# Patient Record
Sex: FEMALE | Race: OTHER | HISPANIC OR LATINO | URBAN - METROPOLITAN AREA
[De-identification: names, ages, dates, MRNs, and addresses within clinical notes are randomized per-mention and may not be internally consistent; named-entity substitution may affect disease eponyms.]

---

## 2023-02-23 ENCOUNTER — INPATIENT (INPATIENT)
Facility: HOSPITAL | Age: 38
LOS: 0 days | Discharge: HOME | DRG: 833 | End: 2023-02-24
Attending: OBSTETRICS & GYNECOLOGY | Admitting: OBSTETRICS & GYNECOLOGY
Payer: MEDICAID

## 2023-02-23 VITALS — TEMPERATURE: 98 F

## 2023-02-23 DIAGNOSIS — O47.03 FALSE LABOR BEFORE 37 COMPLETED WEEKS OF GESTATION, THIRD TRIMESTER: ICD-10-CM

## 2023-02-23 LAB
ALBUMIN SERPL ELPH-MCNC: 3.3 G/DL — SIGNIFICANT CHANGE UP (ref 3.3–5.2)
ALP SERPL-CCNC: 101 U/L — SIGNIFICANT CHANGE UP (ref 40–120)
ALT FLD-CCNC: 9 U/L — SIGNIFICANT CHANGE UP
ANION GAP SERPL CALC-SCNC: 13 MMOL/L — SIGNIFICANT CHANGE UP (ref 5–17)
APPEARANCE UR: CLEAR — SIGNIFICANT CHANGE UP
APTT BLD: 28.1 SEC — SIGNIFICANT CHANGE UP (ref 27.5–35.5)
AST SERPL-CCNC: 18 U/L — SIGNIFICANT CHANGE UP
BACTERIA # UR AUTO: ABNORMAL
BASOPHILS # BLD AUTO: 0.04 K/UL — SIGNIFICANT CHANGE UP (ref 0–0.2)
BASOPHILS NFR BLD AUTO: 0.4 % — SIGNIFICANT CHANGE UP (ref 0–2)
BILIRUB SERPL-MCNC: <0.2 MG/DL — LOW (ref 0.4–2)
BILIRUB UR-MCNC: NEGATIVE — SIGNIFICANT CHANGE UP
BUN SERPL-MCNC: 9.4 MG/DL — SIGNIFICANT CHANGE UP (ref 8–20)
CALCIUM SERPL-MCNC: 9.1 MG/DL — SIGNIFICANT CHANGE UP (ref 8.4–10.5)
CHLORIDE SERPL-SCNC: 103 MMOL/L — SIGNIFICANT CHANGE UP (ref 96–108)
CO2 SERPL-SCNC: 19 MMOL/L — LOW (ref 22–29)
COLOR SPEC: YELLOW — SIGNIFICANT CHANGE UP
CREAT ?TM UR-MCNC: 13 MG/DL — SIGNIFICANT CHANGE UP
CREAT SERPL-MCNC: 0.44 MG/DL — LOW (ref 0.5–1.3)
DIFF PNL FLD: ABNORMAL
EGFR: 129 ML/MIN/1.73M2 — SIGNIFICANT CHANGE UP
EOSINOPHIL # BLD AUTO: 0.18 K/UL — SIGNIFICANT CHANGE UP (ref 0–0.5)
EOSINOPHIL NFR BLD AUTO: 1.8 % — SIGNIFICANT CHANGE UP (ref 0–6)
EPI CELLS # UR: SIGNIFICANT CHANGE UP
FIBRINOGEN PPP-MCNC: 521 MG/DL — HIGH (ref 200–450)
GLUCOSE SERPL-MCNC: 104 MG/DL — HIGH (ref 70–99)
GLUCOSE UR QL: NEGATIVE MG/DL — SIGNIFICANT CHANGE UP
HCT VFR BLD CALC: 31.4 % — LOW (ref 34.5–45)
HGB BLD-MCNC: 10.5 G/DL — LOW (ref 11.5–15.5)
IMM GRANULOCYTES NFR BLD AUTO: 0.7 % — SIGNIFICANT CHANGE UP (ref 0–0.9)
INR BLD: 0.86 RATIO — LOW (ref 0.88–1.16)
KETONES UR-MCNC: NEGATIVE — SIGNIFICANT CHANGE UP
LDH SERPL L TO P-CCNC: 216 U/L — HIGH (ref 98–192)
LEUKOCYTE ESTERASE UR-ACNC: ABNORMAL
LYMPHOCYTES # BLD AUTO: 1.91 K/UL — SIGNIFICANT CHANGE UP (ref 1–3.3)
LYMPHOCYTES # BLD AUTO: 18.6 % — SIGNIFICANT CHANGE UP (ref 13–44)
MCHC RBC-ENTMCNC: 29.6 PG — SIGNIFICANT CHANGE UP (ref 27–34)
MCHC RBC-ENTMCNC: 33.4 GM/DL — SIGNIFICANT CHANGE UP (ref 32–36)
MCV RBC AUTO: 88.5 FL — SIGNIFICANT CHANGE UP (ref 80–100)
MONOCYTES # BLD AUTO: 0.77 K/UL — SIGNIFICANT CHANGE UP (ref 0–0.9)
MONOCYTES NFR BLD AUTO: 7.5 % — SIGNIFICANT CHANGE UP (ref 2–14)
NEUTROPHILS # BLD AUTO: 7.28 K/UL — SIGNIFICANT CHANGE UP (ref 1.8–7.4)
NEUTROPHILS NFR BLD AUTO: 71 % — SIGNIFICANT CHANGE UP (ref 43–77)
NITRITE UR-MCNC: NEGATIVE — SIGNIFICANT CHANGE UP
PH UR: 7 — SIGNIFICANT CHANGE UP (ref 5–8)
PLATELET # BLD AUTO: 239 K/UL — SIGNIFICANT CHANGE UP (ref 150–400)
POTASSIUM SERPL-MCNC: 4 MMOL/L — SIGNIFICANT CHANGE UP (ref 3.5–5.3)
POTASSIUM SERPL-SCNC: 4 MMOL/L — SIGNIFICANT CHANGE UP (ref 3.5–5.3)
PROT ?TM UR-MCNC: 184 MG/DL — HIGH (ref 0–12)
PROT SERPL-MCNC: 5.8 G/DL — LOW (ref 6.6–8.7)
PROT UR-MCNC: 100
PROT/CREAT UR-RTO: 14.2 RATIO — HIGH
PROTHROM AB SERPL-ACNC: 10 SEC — LOW (ref 10.5–13.4)
RBC # BLD: 3.55 M/UL — LOW (ref 3.8–5.2)
RBC # FLD: 13.9 % — SIGNIFICANT CHANGE UP (ref 10.3–14.5)
RBC CASTS # UR COMP ASSIST: SIGNIFICANT CHANGE UP /HPF (ref 0–4)
SODIUM SERPL-SCNC: 135 MMOL/L — SIGNIFICANT CHANGE UP (ref 135–145)
SP GR SPEC: 1 — LOW (ref 1.01–1.02)
URATE SERPL-MCNC: 4.6 MG/DL — SIGNIFICANT CHANGE UP (ref 2.4–5.7)
UROBILINOGEN FLD QL: NEGATIVE MG/DL — SIGNIFICANT CHANGE UP
WBC # BLD: 10.25 K/UL — SIGNIFICANT CHANGE UP (ref 3.8–10.5)
WBC # FLD AUTO: 10.25 K/UL — SIGNIFICANT CHANGE UP (ref 3.8–10.5)
WBC UR QL: SIGNIFICANT CHANGE UP /HPF (ref 0–5)

## 2023-02-23 PROCEDURE — 76818 FETAL BIOPHYS PROFILE W/NST: CPT | Mod: 26

## 2023-02-23 PROCEDURE — 99223 1ST HOSP IP/OBS HIGH 75: CPT | Mod: GC,25

## 2023-02-23 RX ORDER — LABETALOL HCL 100 MG
40 TABLET ORAL ONCE
Refills: 0 | Status: COMPLETED | OUTPATIENT
Start: 2023-02-23 | End: 2023-02-23

## 2023-02-23 RX ORDER — MAGNESIUM SULFATE 500 MG/ML
2 VIAL (ML) INJECTION
Qty: 40 | Refills: 0 | Status: DISCONTINUED | OUTPATIENT
Start: 2023-02-23 | End: 2023-02-24

## 2023-02-23 RX ORDER — LABETALOL HCL 100 MG
20 TABLET ORAL ONCE
Refills: 0 | Status: COMPLETED | OUTPATIENT
Start: 2023-02-23 | End: 2023-02-23

## 2023-02-23 RX ORDER — MAGNESIUM SULFATE 500 MG/ML
4 VIAL (ML) INJECTION ONCE
Refills: 0 | Status: COMPLETED | OUTPATIENT
Start: 2023-02-23 | End: 2023-02-23

## 2023-02-23 RX ADMIN — Medication 40 MILLIGRAM(S): at 23:37

## 2023-02-23 RX ADMIN — Medication 20 MILLIGRAM(S): at 23:21

## 2023-02-23 RX ADMIN — Medication 300 GRAM(S): at 23:43

## 2023-02-23 RX ADMIN — Medication 12 MILLIGRAM(S): at 23:57

## 2023-02-23 NOTE — OB RN PATIENT PROFILE - AS PAIN REST
Normal rate, regular rhythm, normal S1, S2 heart sounds heard.
0 (no pain/absence of nonverbal indicators of pain)

## 2023-02-23 NOTE — OB RN PATIENT PROFILE - FALL HARM RISK - UNIVERSAL INTERVENTIONS
Bed in lowest position, wheels locked, appropriate side rails in place/Call bell, personal items and telephone in reach/Instruct patient to call for assistance before getting out of bed or chair/Non-slip footwear when patient is out of bed/[place] to call system/Physically safe environment - no spills, clutter or unnecessary equipment/Purposeful Proactive Rounding/Room/bathroom lighting operational, light cord in reach

## 2023-02-24 ENCOUNTER — APPOINTMENT (OUTPATIENT)
Dept: ANTEPARTUM | Facility: CLINIC | Age: 38
End: 2023-02-24
Payer: MEDICAID

## 2023-02-24 ENCOUNTER — INPATIENT (INPATIENT)
Facility: HOSPITAL | Age: 38
LOS: 12 days | Discharge: HOME | End: 2023-03-09
Attending: OBSTETRICS & GYNECOLOGY | Admitting: OBSTETRICS & GYNECOLOGY
Payer: MEDICAID

## 2023-02-24 ENCOUNTER — ASOB RESULT (OUTPATIENT)
Age: 38
End: 2023-02-24

## 2023-02-24 VITALS — DIASTOLIC BLOOD PRESSURE: 88 MMHG | SYSTOLIC BLOOD PRESSURE: 157 MMHG | HEART RATE: 80 BPM

## 2023-02-24 VITALS — OXYGEN SATURATION: 98 % | HEART RATE: 80 BPM

## 2023-02-24 DIAGNOSIS — O14.10 SEVERE PRE-ECLAMPSIA, UNSPECIFIED TRIMESTER: ICD-10-CM

## 2023-02-24 DIAGNOSIS — Z00.00 ENCOUNTER FOR GENERAL ADULT MEDICAL EXAMINATION WITHOUT ABNORMAL FINDINGS: ICD-10-CM

## 2023-02-24 DIAGNOSIS — Z3A.28 28 WEEKS GESTATION OF PREGNANCY: ICD-10-CM

## 2023-02-24 DIAGNOSIS — O14.13 SEVERE PRE-ECLAMPSIA, THIRD TRIMESTER: ICD-10-CM

## 2023-02-24 LAB
ABO RH CONFIRMATION: SIGNIFICANT CHANGE UP
ALBUMIN SERPL ELPH-MCNC: 3.4 G/DL — SIGNIFICANT CHANGE UP (ref 3.3–5.2)
ALBUMIN SERPL ELPH-MCNC: 3.7 G/DL — SIGNIFICANT CHANGE UP (ref 3.3–5)
ALP SERPL-CCNC: 104 U/L — SIGNIFICANT CHANGE UP (ref 40–120)
ALP SERPL-CCNC: 99 U/L — SIGNIFICANT CHANGE UP (ref 40–120)
ALT FLD-CCNC: 10 U/L — SIGNIFICANT CHANGE UP (ref 10–45)
ALT FLD-CCNC: 9 U/L — SIGNIFICANT CHANGE UP
ANION GAP SERPL CALC-SCNC: 11 MMOL/L — SIGNIFICANT CHANGE UP (ref 5–17)
ANION GAP SERPL CALC-SCNC: 14 MMOL/L — SIGNIFICANT CHANGE UP (ref 5–17)
APTT BLD: 26.7 SEC — LOW (ref 27.5–35.5)
APTT BLD: 28.2 SEC — SIGNIFICANT CHANGE UP (ref 27.5–35.5)
AST SERPL-CCNC: 15 U/L — SIGNIFICANT CHANGE UP (ref 10–40)
AST SERPL-CCNC: 16 U/L — SIGNIFICANT CHANGE UP
BASOPHILS # BLD AUTO: 0.03 K/UL — SIGNIFICANT CHANGE UP (ref 0–0.2)
BASOPHILS # BLD AUTO: 0.04 K/UL — SIGNIFICANT CHANGE UP (ref 0–0.2)
BASOPHILS NFR BLD AUTO: 0.2 % — SIGNIFICANT CHANGE UP (ref 0–2)
BASOPHILS NFR BLD AUTO: 0.3 % — SIGNIFICANT CHANGE UP (ref 0–2)
BILIRUB SERPL-MCNC: 0.1 MG/DL — LOW (ref 0.2–1.2)
BILIRUB SERPL-MCNC: <0.2 MG/DL — LOW (ref 0.4–2)
BLD GP AB SCN SERPL QL: NEGATIVE — SIGNIFICANT CHANGE UP
BLD GP AB SCN SERPL QL: SIGNIFICANT CHANGE UP
BUN SERPL-MCNC: 10 MG/DL — SIGNIFICANT CHANGE UP (ref 7–23)
BUN SERPL-MCNC: 8.1 MG/DL — SIGNIFICANT CHANGE UP (ref 8–20)
CALCIUM SERPL-MCNC: 8.1 MG/DL — LOW (ref 8.4–10.5)
CALCIUM SERPL-MCNC: 8.7 MG/DL — SIGNIFICANT CHANGE UP (ref 8.4–10.5)
CHLORIDE SERPL-SCNC: 101 MMOL/L — SIGNIFICANT CHANGE UP (ref 96–108)
CHLORIDE SERPL-SCNC: 104 MMOL/L — SIGNIFICANT CHANGE UP (ref 96–108)
CO2 SERPL-SCNC: 17 MMOL/L — LOW (ref 22–31)
CO2 SERPL-SCNC: 19 MMOL/L — LOW (ref 22–29)
COVID-19 SPIKE DOMAIN AB INTERP: POSITIVE
COVID-19 SPIKE DOMAIN ANTIBODY RESULT: >250 U/ML — HIGH
CREAT SERPL-MCNC: 0.47 MG/DL — LOW (ref 0.5–1.3)
CREAT SERPL-MCNC: 0.66 MG/DL — SIGNIFICANT CHANGE UP (ref 0.5–1.3)
EGFR: 117 ML/MIN/1.73M2 — SIGNIFICANT CHANGE UP
EGFR: 127 ML/MIN/1.73M2 — SIGNIFICANT CHANGE UP
EOSINOPHIL # BLD AUTO: 0 K/UL — SIGNIFICANT CHANGE UP (ref 0–0.5)
EOSINOPHIL # BLD AUTO: 0.02 K/UL — SIGNIFICANT CHANGE UP (ref 0–0.5)
EOSINOPHIL NFR BLD AUTO: 0 % — SIGNIFICANT CHANGE UP (ref 0–6)
EOSINOPHIL NFR BLD AUTO: 0.2 % — SIGNIFICANT CHANGE UP (ref 0–6)
FIBRINOGEN PPP-MCNC: 465 MG/DL — HIGH (ref 200–445)
FIBRINOGEN PPP-MCNC: 510 MG/DL — HIGH (ref 200–450)
GLUCOSE SERPL-MCNC: 130 MG/DL — HIGH (ref 70–99)
GLUCOSE SERPL-MCNC: 147 MG/DL — HIGH (ref 70–99)
HBV SURFACE AG SERPL QL IA: SIGNIFICANT CHANGE UP
HCT VFR BLD CALC: 33.6 % — LOW (ref 34.5–45)
HCT VFR BLD CALC: 33.7 % — LOW (ref 34.5–45)
HGB BLD-MCNC: 11.2 G/DL — LOW (ref 11.5–15.5)
HGB BLD-MCNC: 11.4 G/DL — LOW (ref 11.5–15.5)
HIV 1 & 2 AB SERPL IA.RAPID: SIGNIFICANT CHANGE UP
HIV 1+2 AB+HIV1 P24 AG SERPL QL IA: SIGNIFICANT CHANGE UP
IMM GRANULOCYTES NFR BLD AUTO: 1.3 % — HIGH (ref 0–0.9)
IMM GRANULOCYTES NFR BLD AUTO: 1.6 % — HIGH (ref 0–0.9)
INR BLD: 0.89 RATIO — SIGNIFICANT CHANGE UP (ref 0.88–1.16)
LDH SERPL L TO P-CCNC: 201 U/L — SIGNIFICANT CHANGE UP (ref 50–242)
LYMPHOCYTES # BLD AUTO: 1.02 K/UL — SIGNIFICANT CHANGE UP (ref 1–3.3)
LYMPHOCYTES # BLD AUTO: 1.2 K/UL — SIGNIFICANT CHANGE UP (ref 1–3.3)
LYMPHOCYTES # BLD AUTO: 8.2 % — LOW (ref 13–44)
LYMPHOCYTES # BLD AUTO: 8.5 % — LOW (ref 13–44)
MAGNESIUM SERPL-MCNC: 6 MG/DL — HIGH (ref 1.8–2.6)
MAGNESIUM SERPL-MCNC: 6.7 MG/DL — HIGH (ref 1.6–2.6)
MAGNESIUM SERPL-MCNC: 6.9 MG/DL — HIGH (ref 1.6–2.6)
MCHC RBC-ENTMCNC: 30.2 PG — SIGNIFICANT CHANGE UP (ref 27–34)
MCHC RBC-ENTMCNC: 30.3 PG — SIGNIFICANT CHANGE UP (ref 27–34)
MCHC RBC-ENTMCNC: 33.3 GM/DL — SIGNIFICANT CHANGE UP (ref 32–36)
MCHC RBC-ENTMCNC: 33.8 GM/DL — SIGNIFICANT CHANGE UP (ref 32–36)
MCV RBC AUTO: 89.2 FL — SIGNIFICANT CHANGE UP (ref 80–100)
MCV RBC AUTO: 90.8 FL — SIGNIFICANT CHANGE UP (ref 80–100)
MONOCYTES # BLD AUTO: 0.24 K/UL — SIGNIFICANT CHANGE UP (ref 0–0.9)
MONOCYTES # BLD AUTO: 0.47 K/UL — SIGNIFICANT CHANGE UP (ref 0–0.9)
MONOCYTES NFR BLD AUTO: 2 % — SIGNIFICANT CHANGE UP (ref 2–14)
MONOCYTES NFR BLD AUTO: 3.2 % — SIGNIFICANT CHANGE UP (ref 2–14)
NEUTROPHILS # BLD AUTO: 10.43 K/UL — HIGH (ref 1.8–7.4)
NEUTROPHILS # BLD AUTO: 12.73 K/UL — HIGH (ref 1.8–7.4)
NEUTROPHILS NFR BLD AUTO: 87.1 % — HIGH (ref 43–77)
NEUTROPHILS NFR BLD AUTO: 87.4 % — HIGH (ref 43–77)
NRBC # BLD: 0 /100 WBCS — SIGNIFICANT CHANGE UP (ref 0–0)
PLATELET # BLD AUTO: 242 K/UL — SIGNIFICANT CHANGE UP (ref 150–400)
PLATELET # BLD AUTO: 257 K/UL — SIGNIFICANT CHANGE UP (ref 150–400)
POTASSIUM SERPL-MCNC: 4.4 MMOL/L — SIGNIFICANT CHANGE UP (ref 3.5–5.3)
POTASSIUM SERPL-MCNC: 4.5 MMOL/L — SIGNIFICANT CHANGE UP (ref 3.5–5.3)
POTASSIUM SERPL-SCNC: 4.4 MMOL/L — SIGNIFICANT CHANGE UP (ref 3.5–5.3)
POTASSIUM SERPL-SCNC: 4.5 MMOL/L — SIGNIFICANT CHANGE UP (ref 3.5–5.3)
PROT SERPL-MCNC: 6.3 G/DL — LOW (ref 6.6–8.7)
PROT SERPL-MCNC: 6.6 G/DL — SIGNIFICANT CHANGE UP (ref 6–8.3)
PROTHROM AB SERPL-ACNC: 10.2 SEC — LOW (ref 10.5–13.4)
RBC # BLD: 3.7 M/UL — LOW (ref 3.8–5.2)
RBC # BLD: 3.78 M/UL — LOW (ref 3.8–5.2)
RBC # FLD: 14 % — SIGNIFICANT CHANGE UP (ref 10.3–14.5)
RBC # FLD: 14.4 % — SIGNIFICANT CHANGE UP (ref 10.3–14.5)
RH IG SCN BLD-IMP: POSITIVE — SIGNIFICANT CHANGE UP
SARS-COV-2 IGG+IGM SERPL QL IA: >250 U/ML — HIGH
SARS-COV-2 IGG+IGM SERPL QL IA: POSITIVE
SARS-COV-2 RNA SPEC QL NAA+PROBE: SIGNIFICANT CHANGE UP
SODIUM SERPL-SCNC: 132 MMOL/L — LOW (ref 135–145)
SODIUM SERPL-SCNC: 134 MMOL/L — LOW (ref 135–145)
T PALLIDUM AB TITR SER: NEGATIVE — SIGNIFICANT CHANGE UP
URATE SERPL-MCNC: 4.6 MG/DL — SIGNIFICANT CHANGE UP (ref 2.4–5.7)
URATE SERPL-MCNC: 5 MG/DL — SIGNIFICANT CHANGE UP (ref 2.5–7)
URATE SERPL-MCNC: 5.1 MG/DL — SIGNIFICANT CHANGE UP (ref 2.5–7)
WBC # BLD: 11.94 K/UL — HIGH (ref 3.8–10.5)
WBC # BLD: 14.62 K/UL — HIGH (ref 3.8–10.5)
WBC # FLD AUTO: 11.94 K/UL — HIGH (ref 3.8–10.5)
WBC # FLD AUTO: 14.62 K/UL — HIGH (ref 3.8–10.5)

## 2023-02-24 PROCEDURE — U0003: CPT

## 2023-02-24 PROCEDURE — U0005: CPT

## 2023-02-24 PROCEDURE — 76819 FETAL BIOPHYS PROFIL W/O NST: CPT | Mod: 26

## 2023-02-24 PROCEDURE — 83615 LACTATE (LD) (LDH) ENZYME: CPT

## 2023-02-24 PROCEDURE — 86900 BLOOD TYPING SEROLOGIC ABO: CPT

## 2023-02-24 PROCEDURE — 85025 COMPLETE CBC W/AUTO DIFF WBC: CPT

## 2023-02-24 PROCEDURE — 85730 THROMBOPLASTIN TIME PARTIAL: CPT

## 2023-02-24 PROCEDURE — 83735 ASSAY OF MAGNESIUM: CPT

## 2023-02-24 PROCEDURE — 76820 UMBILICAL ARTERY ECHO: CPT | Mod: 26,59,77

## 2023-02-24 PROCEDURE — 76819 FETAL BIOPHYS PROFIL W/O NST: CPT | Mod: 26,77,59

## 2023-02-24 PROCEDURE — 80053 COMPREHEN METABOLIC PANEL: CPT

## 2023-02-24 PROCEDURE — 76816 OB US FOLLOW-UP PER FETUS: CPT | Mod: 26

## 2023-02-24 PROCEDURE — 87389 HIV-1 AG W/HIV-1&-2 AB AG IA: CPT

## 2023-02-24 PROCEDURE — 76821 MIDDLE CEREBRAL ARTERY ECHO: CPT | Mod: 26,59

## 2023-02-24 PROCEDURE — 86769 SARS-COV-2 COVID-19 ANTIBODY: CPT

## 2023-02-24 PROCEDURE — 93976 VASCULAR STUDY: CPT | Mod: 26

## 2023-02-24 PROCEDURE — 76816 OB US FOLLOW-UP PER FETUS: CPT | Mod: 26,77

## 2023-02-24 PROCEDURE — 99221 1ST HOSP IP/OBS SF/LOW 40: CPT

## 2023-02-24 PROCEDURE — 36415 COLL VENOUS BLD VENIPUNCTURE: CPT

## 2023-02-24 PROCEDURE — 86901 BLOOD TYPING SEROLOGIC RH(D): CPT

## 2023-02-24 PROCEDURE — 86703 HIV-1/HIV-2 1 RESULT ANTBDY: CPT

## 2023-02-24 PROCEDURE — 85610 PROTHROMBIN TIME: CPT

## 2023-02-24 PROCEDURE — 84550 ASSAY OF BLOOD/URIC ACID: CPT

## 2023-02-24 PROCEDURE — 76820 UMBILICAL ARTERY ECHO: CPT | Mod: 26

## 2023-02-24 PROCEDURE — 86780 TREPONEMA PALLIDUM: CPT

## 2023-02-24 PROCEDURE — 84156 ASSAY OF PROTEIN URINE: CPT

## 2023-02-24 PROCEDURE — 86850 RBC ANTIBODY SCREEN: CPT

## 2023-02-24 PROCEDURE — 81001 URINALYSIS AUTO W/SCOPE: CPT

## 2023-02-24 PROCEDURE — 82570 ASSAY OF URINE CREATININE: CPT

## 2023-02-24 RX ORDER — SODIUM CHLORIDE 9 MG/ML
1000 INJECTION, SOLUTION INTRAVENOUS
Refills: 0 | Status: DISCONTINUED | OUTPATIENT
Start: 2023-02-24 | End: 2023-02-25

## 2023-02-24 RX ORDER — LABETALOL HCL 100 MG
80 TABLET ORAL ONCE
Refills: 0 | Status: COMPLETED | OUTPATIENT
Start: 2023-02-24 | End: 2023-02-24

## 2023-02-24 RX ORDER — CITRIC ACID/SODIUM CITRATE 300-500 MG
15 SOLUTION, ORAL ORAL EVERY 6 HOURS
Refills: 0 | Status: DISCONTINUED | OUTPATIENT
Start: 2023-02-24 | End: 2023-02-25

## 2023-02-24 RX ORDER — NIFEDIPINE 30 MG
30 TABLET, EXTENDED RELEASE 24 HR ORAL DAILY
Refills: 0 | Status: DISCONTINUED | OUTPATIENT
Start: 2023-02-24 | End: 2023-02-24

## 2023-02-24 RX ORDER — NIFEDIPINE 30 MG
30 TABLET, EXTENDED RELEASE 24 HR ORAL DAILY
Refills: 0 | Status: DISCONTINUED | OUTPATIENT
Start: 2023-02-24 | End: 2023-02-25

## 2023-02-24 RX ORDER — MAGNESIUM SULFATE 500 MG/ML
2 VIAL (ML) INJECTION
Qty: 40 | Refills: 0 | Status: DISCONTINUED | OUTPATIENT
Start: 2023-02-24 | End: 2023-02-25

## 2023-02-24 RX ORDER — OXYTOCIN 10 UNIT/ML
333.33 VIAL (ML) INJECTION
Qty: 20 | Refills: 0 | Status: COMPLETED | OUTPATIENT
Start: 2023-02-24 | End: 2023-02-24

## 2023-02-24 RX ORDER — CHLORHEXIDINE GLUCONATE 213 G/1000ML
1 SOLUTION TOPICAL ONCE
Refills: 0 | Status: DISCONTINUED | OUTPATIENT
Start: 2023-02-24 | End: 2023-02-25

## 2023-02-24 RX ORDER — SODIUM CHLORIDE 9 MG/ML
1000 INJECTION, SOLUTION INTRAVENOUS
Refills: 0 | Status: DISCONTINUED | OUTPATIENT
Start: 2023-02-24 | End: 2023-02-24

## 2023-02-24 RX ADMIN — Medication 80 MILLIGRAM(S): at 00:37

## 2023-02-24 RX ADMIN — Medication 12 MILLIGRAM(S): at 23:31

## 2023-02-24 RX ADMIN — Medication 30 MILLIGRAM(S): at 02:13

## 2023-02-24 RX ADMIN — Medication 50 GM/HR: at 00:04

## 2023-02-24 RX ADMIN — Medication 30 MILLIGRAM(S): at 19:40

## 2023-02-24 RX ADMIN — SODIUM CHLORIDE 75 MILLILITER(S): 9 INJECTION, SOLUTION INTRAVENOUS at 06:15

## 2023-02-24 NOTE — OB PROVIDER H&P - NSHPLABSRESULTS_GEN_ALL_CORE
Labs:             11.4   11.94 )-----------( 242      ( 02-24 @ 06:30 )             33.7     02-24 @ 06:30    134  |  104  |  8.1  ----------------------------<  130  4.5   |  19.0  |  0.47    Ca    8.7      02-24 @ 06:30  Mg     6.7     02-24 @ 12:02    TPro  6.3  /  Alb  3.4  /  TBili  <0.2  /  DBili  x   /  AST  16  /  ALT  9   /  AlkPhos  99  02-24 @ 06:30    PT/INR - ( 02-23 @ 23:11 )   PT: 10.0 sec;   INR: 0.86 ratio    PTT - ( 02-24 @ 06:30 )  PTT:28.2 sec    Uric Acid: (02-24 @ 06:30)  4.6      Fibrinogen: (02-24 @ 06:30)  --       LDH: (02-24 @ 06:30)  --       CAPILLARY BLOOD GLUCOSE

## 2023-02-24 NOTE — OB PROVIDER LABOR PROGRESS NOTE - ASSESSMENT
38 yo  at 28.6 weeks presents with severe pre-eclampsia requiring emergent antihypertensive medication, fetus with severe growth restriction but reassuring fetal heart tracing no s/s pre-term labor. Consent for transfer to [place] signed after questions answered thru .  -continue Magnesium infusion, no s/s toxicity  -reassuring fetal testing  -s/p betamethasone x1  -breech presentation  -case discussed with [place]  36 yo  at 28.6 weeks presents with severe pre-eclampsia requiring emergent antihypertensive medication, fetus with severe growth restriction and elevated dopplers but reassuring fetal heart tracing no s/s pre-term labor. Consent for transfer to [place] signed after questions answered thru .  -continue Magnesium infusion, no s/s toxicity  -reassuring fetal testing  -s/p betamethasone x1  -breech presentation  -case discussed with [facility]  38 yo  at 28.6 weeks presents with severe pre-eclampsia requiring emergent antihypertensive medication, fetus with severe growth restriction and elevated dopplers but reassuring fetal heart tracing no s/s pre-term labor. Consent for transfer to [place] signed after questions answered thru .  -continue Magnesium infusion, no s/s toxicity  -reassuring fetal testing  -s/p betamethasone x1  -breech presentation  [facility] Dr[facility]

## 2023-02-24 NOTE — OB PROVIDER H&P - NSPREVIOUSTERM_OBGYN_ALL_OB
Discharge Instructions    Discharged to home by car with self. Discharged via walking, hospital escort: Refused.  Special equipment needed: Not Applicable    Be sure to schedule a follow-up appointment with your primary care doctor or any specialists as instructed.     Discharge Plan:   Smoking Cessation Offered: Patient Counseled  Pneumococcal Vaccine Given - only chart on this line when given: Given (See MAR)  Influenza Vaccine Indication: Indicated: Not available from distributor/    I understand that a diet low in cholesterol, fat, and sodium is recommended for good health. Unless I have been given specific instructions below for another diet, I accept this instruction as my diet prescription.   Other diet: regular    Special Instructions: None    · Is patient discharged on Warfarin / Coumadin?   No     · Is patient Post Blood Transfusion NO    Chronic Obstructive Pulmonary Disease  Chronic obstructive pulmonary disease (COPD) is a common lung condition in which airflow from the lungs is limited. COPD is a general term that can be used to describe many different lung problems that limit airflow, including both chronic bronchitis and emphysema. If you have COPD, your lung function will probably never return to normal, but there are measures you can take to improve lung function and make yourself feel better.  CAUSES   Smoking (common).  Exposure to secondhand smoke.  Genetic problems.  Chronic inflammatory lung diseases or recurrent infections.  SYMPTOMS  Shortness of breath, especially with physical activity.  Deep, persistent (chronic) cough with a large amount of thick mucus.  Wheezing.  Rapid breaths (tachypnea).  Gray or bluish discoloration (cyanosis) of the skin, especially in your fingers, toes, or lips.  Fatigue.  Weight loss.  Frequent infections or episodes when breathing symptoms become much worse (exacerbations).  Chest tightness.  DIAGNOSIS  Your health care provider will take a medical  history and perform a physical examination to diagnose COPD. Additional tests for COPD may include:  Lung (pulmonary) function tests.  Chest X-ray.  CT scan.  Blood tests.  TREATMENT   Treatment for COPD may include:  Inhaler and nebulizer medicines. These help manage the symptoms of COPD and make your breathing more comfortable.  Supplemental oxygen. Supplemental oxygen is only helpful if you have a low oxygen level in your blood.  Exercise and physical activity. These are beneficial for nearly all people with COPD.  Lung surgery or transplant.  Nutrition therapy to gain weight, if you are underweight.  Pulmonary rehabilitation. This may involve working with a team of health care providers and specialists, such as respiratory, occupational, and physical therapists.  HOME CARE INSTRUCTIONS  Take all medicines (inhaled or pills) as directed by your health care provider.  Avoid over-the-counter medicines or cough syrups that dry up your airway (such as antihistamines) and slow down the elimination of secretions unless instructed otherwise by your health care provider.  If you are a smoker, the most important thing that you can do is stop smoking. Continuing to smoke will cause further lung damage and breathing trouble. Ask your health care provider for help with quitting smoking. He or she can direct you to community resources or hospitals that provide support.  Avoid exposure to irritants such as smoke, chemicals, and fumes that aggravate your breathing.  Use oxygen therapy and pulmonary rehabilitation if directed by your health care provider. If you require home oxygen therapy, ask your health care provider whether you should purchase a pulse oximeter to measure your oxygen level at home.  Avoid contact with individuals who have a contagious illness.  Avoid extreme temperature and humidity changes.  Eat healthy foods. Eating smaller, more frequent meals and resting before meals may help you maintain your  strength.  Stay active, but balance activity with periods of rest. Exercise and physical activity will help you maintain your ability to do things you want to do.  Preventing infection and hospitalization is very important when you have COPD. Make sure to receive all the vaccines your health care provider recommends, especially the pneumococcal and influenza vaccines. Ask your health care provider whether you need a pneumonia vaccine.  Learn and use relaxation techniques to manage stress.  Learn and use controlled breathing techniques as directed by your health care provider. Controlled breathing techniques include:  Pursed lip breathing. Start by breathing in (inhaling) through your nose for 1 second. Then, purse your lips as if you were going to whistle and breathe out (exhale) through the pursed lips for 2 seconds.  Diaphragmatic breathing. Start by putting one hand on your abdomen just above your waist. Inhale slowly through your nose. The hand on your abdomen should move out. Then purse your lips and exhale slowly. You should be able to feel the hand on your abdomen moving in as you exhale.  Learn and use controlled coughing to clear mucus from your lungs. Controlled coughing is a series of short, progressive coughs. The steps of controlled coughing are:  Lean your head slightly forward.  Breathe in deeply using diaphragmatic breathing.  Try to hold your breath for 3 seconds.  Keep your mouth slightly open while coughing twice.  Spit any mucus out into a tissue.  Rest and repeat the steps once or twice as needed.  SEEK MEDICAL CARE IF:  You are coughing up more mucus than usual.  There is a change in the color or thickness of your mucus.  Your breathing is more labored than usual.  Your breathing is faster than usual.  SEEK IMMEDIATE MEDICAL CARE IF:  You have shortness of breath while you are resting.  You have shortness of breath that prevents you from:  Being able to talk.  Performing your usual physical  activities.  You have chest pain lasting longer than 5 minutes.  Your skin color is more cyanotic than usual.  You measure low oxygen saturations for longer than 5 minutes with a pulse oximeter.  MAKE SURE YOU:  Understand these instructions.  Will watch your condition.  Will get help right away if you are not doing well or get worse.     This information is not intended to replace advice given to you by your health care provider. Make sure you discuss any questions you have with your health care provider.     Document Released: 09/27/2006 Document Revised: 01/08/2016 Document Reviewed: 08/14/2014  [employer] Interactive Patient Education ©2016 [employer]      Depression / Suicide Risk    As you are discharged from this [facility] facility, it is important to learn how to keep safe from harming yourself.    Recognize the warning signs:  · Abrupt changes in personality, positive or negative- including increase in energy   · Giving away possessions  · Change in eating patterns- significant weight changes-  positive or negative  · Change in sleeping patterns- unable to sleep or sleeping all the time   · Unwillingness or inability to communicate  · Depression  · Unusual sadness, discouragement and loneliness  · Talk of wanting to die  · Neglect of personal appearance   · Rebelliousness- reckless behavior  · Withdrawal from people/activities they love  · Confusion- inability to concentrate     If you or a loved one observes any of these behaviors or has concerns about self-harm, here's what you can do:  · Talk about it- your feelings and reasons for harming yourself  · Remove any means that you might use to hurt yourself (examples: pills, rope, extension cords, firearm)  · Get professional help from the community (Mental Health, Substance Abuse, psychological counseling)  · Do not be alone:Call your Safe Contact- someone whom you trust who will be there for you.  · Call your local CRISIS HOTLINE 936-6855 or  922-591-7278  · Call your local Children's Mobile Crisis Response Team [place] (992) 958-9334 or www.[employer]  · Call the toll free National Suicide Prevention Hotlines   · National Suicide Prevention Lifeline 010-394-RJMV (8776)  · National [employer] Line Network 800-SUICIDE (197-7420)       No

## 2023-02-24 NOTE — CONSULT NOTE PEDS - SUBJECTIVE AND OBJECTIVE BOX
Ms. Parsons is a 38y/o  at 28w6d gestational age admitted for pre-eclampsia with severe features. Also with fetal growth restriction and elevated dopplers. Most recent EFW 995g on . Betamethasone initiated .  NICU consulted to discuss what to expect if she were to deliver at 28-29 weeks gestation.  I met with Ms. Parsons and her partner and we reviewed the followin.	The NICU team will be present at her delivery and will immediately assess and care for her infant.  2.	Due to prematurity, the infant is likely to require respiratory support, most commonly in the form of CPAP. The outcomes improve after  steroids. Less commonly, some infants at this gestational age may require intubation and mechanical ventilation with surfactant administration. Some infants require prolonged respiratory support throughout their NICU stay.  3.	Depending on the clinical status of the infant, enteral feedings may not be started immediately. IV nutrition in the form of TPN would be provided via umbilical line or other central line until the infant is able to tolerate full enteral feedings. Due to immature suck/swallow, the baby may require an orogastric tube once feeds are initiated. The baby is also at risk for hypoglycemia.    4.	Discussed the benefits of breastfeeding in  infants and encouraged mother to pump following delivery.  5.	Due to prematurity, the infant will be at increased risk for infection and would likely be started on antibiotics after birth. The infant will be screened for infections following delivery and may require other courses of antibiotics during their hospital course if an infection were suspected.  6.	The infant will be at risk for other complications of prematurity, including but not limited to IVH, ROP, PDA, anemia, and developmental delays. Offered to discuss these potential issues in greater detail at any point if desired by family.  7.	Thermoregulation issues and need to be in an isolette with slow weaning to a crib discussed.  8.	Length of stay is highly variable, but at this gestational age, average stay is approximately 7-9 weeks. Discussed discharge criteria.    Ms. Parsons and her partner had the chance to ask any questions and was encouraged to contact the NICU at that time if additional questions arise.      Thank you for the opportunity to participate in the care of this patient and please inform us of any changes in her status.

## 2023-02-24 NOTE — OB PROVIDER LABOR PROGRESS NOTE - NS_SUBJECTIVE/OBJECTIVE_OBGYN_ALL_OB_FT
Transfer Note    CHRIS GREENWOOD is a 37y  at 28.6 weeks GA who presents to L&D with elevated BP's at home. And one week of new onset lower extremity edema.   Pt denies vaginal bleeding, contractions and leakage of fluid. She endorses good fetal movement.   Pt denies headaches, visual disturbances, RUQ pain, epigastric pain, or SOB.     Pregnancy course is complicated by severe pre-eclampsia requiring Labetalol push 20/40/80 and now Procardia XL 30 mg x1 at 2 am. She was started on Magnesium drip and is tolerating well.   Growth scan demonstrates breech presentation at 1% (26 week gestation)     POB: none  PGYN: -fibroids/-cysts, denied STD hx, denies abnormal PAPs  PMH: none  PSH: none   SH: Denies tobacco use, EtOH use and illicit drug use during the pregnancy; Feels safe at home  Meds: PNV  All: NKDA                        11.4   11.94 )-----------( 242      ( 2023 06:30 )             33.7     02-    134<L>  |  104  |  8.1  ----------------------------<  130<H>  4.5   |  19.0<L>  |  0.47<L>    Ca    8.7      2023 06:30  Mg     6.0     02-24    TPro  6.3<L>  /  Alb  3.4  /  TBili  <0.2<L>  /  DBili  x   /  AST  16  /  ALT  9   /  AlkPhos  99  02-24        Urinalysis Basic - ( 2023 23:11 )    Color: Yellow / Appearance: Clear / S.005 / pH: x  Gluc: x / Ketone: Negative  / Bili: Negative / Urobili: Negative mg/dL   Blood: x / Protein: 100 / Nitrite: Negative   Leuk Esterase: Trace / RBC: 0-2 /HPF / WBC 0-2 /HPF   Sq Epi: x / Non Sq Epi: Occasional / Bacteria: Occasional    PT/INR - ( 2023 23:11 )   PT: 10.0 sec;   INR: 0.86 ratio      PTT - ( 2023 06:30 )  PTT:28.2 sec Transfer Note    CHRIS GREENWOOD is a 37y  at 28.6 weeks GA who presents to L&D with elevated BP's at home. And one week of new onset lower extremity edema.   Pt denies vaginal bleeding, contractions and leakage of fluid. She endorses good fetal movement.   Pt denies headaches, visual disturbances, RUQ pain, epigastric pain, or SOB.     Pregnancy course is complicated by severe pre-eclampsia requiring Labetalol push 20/40/80 and now Procardia XL 30 mg x1 at 2 am. She was started on Magnesium drip and is tolerating well.   Growth scan demonstrates breech presentation at <1% 945 gm (26 week gestation), anterior placenta, elevated dopplers with intermittent absent end diastolic flow    POB: none  PGYN: -fibroids/-cysts, denied STD hx, denies abnormal PAPs  PMH: none  PSH: none   SH: Denies tobacco use, EtOH use and illicit drug use during the pregnancy; Feels safe at home  Meds: PNV  All: NKDA                        11.4   11.94 )-----------( 242      ( 2023 06:30 )             33.7     02-    134<L>  |  104  |  8.1  ----------------------------<  130<H>  4.5   |  19.0<L>  |  0.47<L>    Ca    8.7      2023 06:30  Mg     6.0     -24    TPro  6.3<L>  /  Alb  3.4  /  TBili  <0.2<L>  /  DBili  x   /  AST  16  /  ALT  9   /  AlkPhos  99  02-24        Urinalysis Basic - ( 2023 23:11 )    Color: Yellow / Appearance: Clear / S.005 / pH: x  Gluc: x / Ketone: Negative  / Bili: Negative / Urobili: Negative mg/dL   Blood: x / Protein: 100 / Nitrite: Negative   Leuk Esterase: Trace / RBC: 0-2 /HPF / WBC 0-2 /HPF   Sq Epi: x / Non Sq Epi: Occasional / Bacteria: Occasional    PT/INR - ( 2023 23:11 )   PT: 10.0 sec;   INR: 0.86 ratio      PTT - ( 2023 06:30 )  PTT:28.2 sec

## 2023-02-24 NOTE — CONSULT NOTE ADULT - PROBLEM SELECTOR RECOMMENDATION 9
- Severe range BP's on presentation 180's/100's. S/p IV labetalol 20, 40, 80mg pushes  - MgSO4   - Bethamethasone dose #1@ 23:57  - Will start procardia 30mg XL daily   - To receive official growth and doppler studies   - Delivery at 34 weeks

## 2023-02-24 NOTE — CHART NOTE - NSCHARTNOTESELECT_GEN_ALL_CORE
Nursing Note by Adenike Swartz RN at 09/21/17 05:34 PM     Author:  Adenike Swartz RN Service:  (none) Author Type:  Registered Nurse     Filed:  09/21/17 05:34 PM Encounter Date:  9/21/2017 Status:  Signed     :  Adenike Swartz RN (Registered Nurse)            Patient brought to exam room.  Electronically Signed by:    Adenike Swartz RN , 9/21/2017  Taco Sebastian was offered treatment for pain control:[CG1.1T] No.   Pain rated as 0 (zero).[CG1.1M]Electronically Signed by:    Adenike Swartz RN , 9/21/2017    Last visit with JIGNA VALENCIA was on No match found  Last visit with [facility] was on No match found  Match done based on reference date of today 9/21/17[CG1.1T]            Revision History        User Key Date/Time User Provider Type Action    > CG1.1 09/21/17 05:34 PM Adenike Swartz RN Registered Nurse Sign    M - Manual, T - Template            
Transfer Note

## 2023-02-24 NOTE — OB PROVIDER H&P - ASSESSMENT
A/P:     Patient is a   yo  G-P- at -w-d GA presenting with     #FWB  - NST BID  - ATU sono   - c/w PNV   - f/u GBS     #MWB  - SLIV   - Reg diet  - HSQ A/P:   36yo  at 28w6d GA transferred from [facility] for management of sPEC. Additionally, fetus found to be severe IUGR with elevated dopplers. At this time, blood pressures improving on standing blood pressure medication. Patient asymptomatic at this time. Fetal surveillance overall reassuring.     #sPEC  - cont MgSO4   - Procardia 30XL, will uptitrate as needed   - s/p Lab 20/40/80 prior to transfer   - HELLPwnl   - BMZ(- )    #IUGR  - EFW 995g (2%, AC 2%) w/ elevated dopplers  - serial growths  - fetal monitoring with twice weekly BPP/Dopplers    #FWB  - cont monitoring   - ATU(): Br, ant. BRIGIDA 15.94. 995g (2%, AC 2%). UAD elevated. 4.8cm posterior myoma  - c/w PNV   - Mg/BMZ as above   - f/u GBS     #MWB  - LR@50 with Mg   - NPO  - SCDs    Magdalena Alvarado MD PGY3   Patient seen and d/w Dr. Hobson and Dr. Godoy A/P:   36yo  at 28w6d GA transferred from [facility] for management of sPEC. Additionally, fetus found to be severe IUGR with elevated dopplers. At this time, blood pressures improving on standing blood pressure medication. Patient asymptomatic at this time. Fetal surveillance overall reassuring.     #sPEC  - cont MgSO4   - Procardia 30XL, will uptitrate as needed   - s/p Lab 20/40/80 prior to transfer   - HELLPwnl   - BMZ(- )    #IUGR  - EFW 995g (2%, AC 2%) w/ elevated dopplers  - serial growths  - fetal monitoring with twice weekly BPP/Dopplers    #FWB  - cont monitoring   - ATU(): Br, ant. BRIGIDA 15.94. 995g (2%, AC 2%). UAD elevated. 4.8cm posterior myoma  - c/w PNV   - Mg/BMZ as above   - f/u GBS     #MWB  - LR@50 with Mg   - NPO  - SCDs    Magdalena Alvarado MD PGY3   Patient seen and d/w Dr. Hobson and Dr. Godoy    ---------------    MFM Fellow Addendum     36yo  at 28w6d transferred from [facility] with preeclampsia with severe features.  Fetus BREECH with severe growth restriction (2%ile on our biometry) with elevated UA Dopplers (previously iAEDV at [place]). Amniotic fluid subjectively low although with normal MVP. On continuous monitoring with     Recommendations:   -Admit to antepartum  A/P:   36yo  at 28w6d GA transferred from [facility] for management of sPEC. Additionally, fetus found to be severe IUGR with elevated dopplers. At this time, blood pressures improving on standing blood pressure medication. Patient asymptomatic at this time. Fetal surveillance overall reassuring.     #sPEC  - cont MgSO4   - Procardia 30XL, will uptitrate as needed   - s/p Lab 20/40/80 prior to transfer   - HELLPwnl   - BMZ(- )    #IUGR  - EFW 995g (2%, AC 2%) w/ elevated dopplers  - serial growths  - fetal monitoring with twice weekly BPP/Dopplers    #FWB  - cont monitoring   - ATU(): Br, ant. BRIGIDA 15.94. 995g (2%, AC 2%). UAD elevated. 4.8cm posterior myoma  - c/w PNV   - Mg/BMZ as above   - f/u GBS     #MWB  - LR@50 with Mg   - NPO  - SCDs    Magdalena Alvarado MD PGY3   Patient seen and d/w Dr. Hobson and Dr. Godoy    ---------------    MFM Fellow Addendum     36yo  at 28w6d dated by LMP c/w 12 week sono transferred from [facility] with preeclampsia with severe features and with new sIUGR with abnormal fetal Dopplers. Patient met criteria based on persistent severe-range blood pressures requiring multiple IV pushes as well as proteinuria, currently on magnesium for seizure prophylaxis. Blood pressures currently mild-range on nifedipine 30mg XL. Denies history of chronic hypertension. Asymptomatic with no serum abnormalities. Fetus BREECH with severe growth restriction (2%ile on our biometry) with elevated UA Dopplers (previously iAEDV at [place]). Amniotic fluid subjectively low although with normal MVP. Patient received dose 1/2 betamethasone for fetal lung maturity. Moderate variability with no decelerations on fetal tracing although difficult to monitor due to fetal position and maternal habitus. Small subserosal/possibly pedunculated fibroids seen on bedside ultrasound. Patient's history reviewed, this is her first pregnancy, she denies any past medical or surgical history, takes PNV only with no other medications, NKDA, no toxic habits or significant family history. Was not on aspirin for preeclampsia prophylaxis. PNC with [facility], records to be requested. Overall clinical picture consistent with early-onset preeclampsia with severe features with resulting placental insufficiency.     Recommendations:   -Admit to L&D for magnesium and inpatient monitoring   -Second dose betamethasone for fetal lung maturity at midnight   -Increase nifedipine to 60mg tonight if persistently mild-range, goal BP <140/90  -Continue magnesium until tomorrow morning for seizure prophylaxis, at which time antepartum team will assess whether patient is stable for transfer to antepartum floor   -Continuous fetal monitoring until completion of magnesium course. If reassuring fetal status will continue monitoring with twice weekly BPP/UA Dopplers  -Consents for classical  if patient requires delivery    -Inpatient monitoring until delivery. Tentative delivery timing 34 weeks however patient counseled that earlier delivery may be indicated in event of abnormal UA Dopplers, nonreassuring fetal testing, poorly controlled blood pressures, lab abnormalities or evidence of end-organ damage   -APLS testing if requires delivery prior to 34 weeks   -Cardio-OB evaluation postpartum, with aspirin ppx for any future pregnancy     Gilma Hobson MD  Seen and counseled with Dr. Godoy   A/P:   36yo  at 28w6d GA transferred from [facility] for management of sPEC. Additionally, fetus found to be severe IUGR with elevated dopplers. At this time, blood pressures improving on standing blood pressure medication. Patient asymptomatic at this time. Fetal surveillance overall reassuring.     #sPEC  - cont MgSO4   - Procardia 30XL, will uptitrate as needed   - s/p Lab 20/40/80 prior to transfer   - HELLPwnl   - BMZ(- )    #IUGR  - EFW 995g (2%, AC 2%) w/ elevated dopplers  - serial growths  - fetal monitoring with twice weekly BPP/Dopplers    #FWB  - cont monitoring   - ATU(): Br, ant. BRIGIDA 15.94. 995g (2%, AC 2%). UAD elevated. 4.8cm posterior myoma  - c/w PNV   - Mg/BMZ as above   - f/u GBS     #MWB  - LR@50 with Mg   - NPO  - SCDs    Magdalena Alvarado MD PGY3   Patient seen and d/w Dr. Hobson and Dr. Godoy    ---------------    MFM Fellow Addendum     36yo  at 28w6d dated by LMP c/w 12 week sono transferred from [facility] with preeclampsia with severe features and with new sIUGR with abnormal fetal Dopplers. Patient met criteria based on persistent severe-range blood pressures requiring multiple IV pushes as well as proteinuria, currently on magnesium for seizure prophylaxis. Blood pressures currently mild-range on nifedipine 30mg XL. Denies history of chronic hypertension. Asymptomatic with no serum abnormalities. Fetus BREECH with severe growth restriction (2%ile on our biometry) with elevated UA Dopplers (previously iAEDV at [place]). Amniotic fluid subjectively low although with normal MVP. Patient received dose 1/2 betamethasone for fetal lung maturity. Moderate variability with no decelerations on fetal tracing although difficult to monitor due to fetal position and maternal habitus. Small subserosal/possibly pedunculated fibroids seen on bedside ultrasound. Patient's history reviewed, this is her first pregnancy, she denies any past medical or surgical history, takes PNV only with no other medications, NKDA, no toxic habits or significant family history. Was not on aspirin for preeclampsia prophylaxis. PNC with [facility], records to be requested. Overall clinical picture consistent with early-onset preeclampsia with severe features with resulting placental insufficiency.     Recommendations:   -Admit to L&D for magnesium and inpatient monitoring   -Second dose betamethasone for fetal lung maturity at midnight. NICU consult   -Increase nifedipine to 60mg tonight if persistently mild-range, goal BP <140/90  -Continue magnesium until tomorrow morning for seizure prophylaxis, at which time antepartum team will assess whether patient is stable for transfer to antepartum floor   -Continuous fetal monitoring until completion of magnesium course. If reassuring fetal status will continue monitoring with twice weekly BPP/UA Dopplers  -Consents for classical  if patient requires delivery    -Inpatient monitoring until delivery. Tentative delivery timing 34 weeks however patient counseled that earlier delivery may be indicated in event of abnormal UA Dopplers, nonreassuring fetal testing, poorly controlled blood pressures, lab abnormalities or evidence of end-organ damage   -APLS testing if requires delivery prior to 34 weeks   -Cardio-OB evaluation postpartum, with aspirin ppx for any future pregnancy     Gilma Hobson MD  Seen and counseled with Dr. Godoy

## 2023-02-24 NOTE — CONSULT NOTE ADULT - PROBLEM SELECTOR RECOMMENDATION 2
- Continuous toco and fetal monitoring   - Will obtain prenatal records from Dr. Grossman  - Breech presentation, BRIGIDA 9.3 - Continuous toco and fetal monitoring   - Will obtain prenatal records from Dr. Grossman  - Breech presentation, BRIGIDA 9.3, EFW approximately 970g

## 2023-02-24 NOTE — OB RN PATIENT PROFILE - MATERNAL MARITAL STATUS, OB PROFILE
single

## 2023-02-24 NOTE — CONSULT NOTE ADULT - SUBJECTIVE AND OBJECTIVE BOX
CHRIS GREENWOOD is a 37y  at 28.6 weeks GA who presents to L&D with elevated BP's at home. And one week of new onset lower extremity edema.   Pt denies vaginal bleeding, contractions and leakage of fluid. She endorses good fetal movement.   Pt denies headaches, visual disturbances, RUQ pain, epigastric pain, or SOB.     Pregnancy course is  uncomplicated.     POB: none  PGYN: -fibroids/-cysts, denied STD hx, denies abnormal PAPs  PMH: none  PSH: none   SH: Denies tobacco use, EtOH use and illicit drug use during the pregnancy; Feels safe at home  Meds: PNV  All: NKDA     SUBJECTIVE:    REVIEW OF SYSTEMS:    CONSTITUTIONAL: No weakness, fevers or chills  EYES/ENT: No visual changes;  No vertigo or throat pain   NECK: No pain or stiffness  RESPIRATORY: No cough, wheezing, hemoptysis; No shortness of breath  CARDIOVASCULAR: No chest pain or palpitations  GASTROINTESTINAL: No abdominal or epigastric pain. No nausea, vomiting, or hematemesis; No diarrhea or constipation. No melena or hematochezia.  GENITOURINARY: No dysuria, frequency or hematuria  NEUROLOGICAL: No numbness or weakness  SKIN: No itching, burning, rashes, or lesions   All other review of systems is negative unless indicated above.      Vital Signs:  Vital Signs Last 24 Hrs  T(C): 36.6 (2023 23:55), Max: 36.6 (2023 22:45)  T(F): 97.9 (2023 23:55), Max: 97.9 (2023 22:45)  HR: 89 (2023 01:43) (78 - 98)  BP: 140/84 (2023 01:12) (140/84 - 184/96)  BP(mean): --  RR: 17 (2023 23:55) (17 - 17)  SpO2: 99% (2023 01:38) (90% - 100%)    Parameters below as of 2023 23:55  Patient On (Oxygen Delivery Method): room air      Height (cm): 155 (23 @ 23:55)  Weight (kg): 67.1 (23 @ 23:55)  BMI (kg/m2): 27.9 (23 @ 23:55)  BSA (m2): 1.66 (23 @ 23:55)    Physical Exam:  General: Adult female in NAD  CVS: RRR, +S1/S2, no murmurs  Lungs: CTAB, no wheezing, rhonchi or rales  Abdomen: soft, non-tender, gravid uterus  Pelvic: Deferred  Ext: No cyanosis, edema or calf tenderness  Skin: No rashes or lesions on exposed skin  Neuro: Normal DTRs, grossly intact    Labs:                          10.5   10.25 )-----------( 239      ( 2023 23:11 )             31.4         135  |  103  |  9.4  ----------------------------<  104<H>  4.0   |  19.0<L>  |  0.44<L>    Ca    9.1      2023 23:11    TPro  5.8<L>  /  Alb  3.3  /  TBili  <0.2<L>  /  DBili  x   /  AST  18  /  ALT  9   /  AlkPhos  101      PT/INR - ( 2023 23:11 )   PT: 10.0 sec;   INR: 0.86 ratio         PTT - ( 2023 23:11 )  PTT:28.1 sec        MEDICATIONS  (STANDING):  betamethasone Injectable 12 milliGRAM(s) IntraMuscular every 24 hours  magnesium sulfate Infusion 2 Gm/Hr (50 mL/Hr) IV Continuous <Continuous>  prenatal multivitamin 1 Tablet(s) Oral daily       CHRIS GREENWOOD is a 37y  at 28.6 weeks GA who presents to L&D with elevated BP's at home. And one week of new onset lower extremity edema.   Pt denies vaginal bleeding, contractions and leakage of fluid. She endorses good fetal movement.   Pt denies headaches, visual disturbances, RUQ pain, epigastric pain, or SOB.     Pregnancy course is  uncomplicated.   EDC 2023    POB: none  PGYN: -fibroids/-cysts, denied STD hx, denies abnormal PAPs  PMH: none  PSH: none   SH: Denies tobacco use, EtOH use and illicit drug use during the pregnancy; Feels safe at home  Meds: PNV  All: NKDA     SUBJECTIVE:    REVIEW OF SYSTEMS:    CONSTITUTIONAL: No weakness, fevers or chills  EYES/ENT: No visual changes;  No vertigo or throat pain   NECK: No pain or stiffness  RESPIRATORY: No cough, wheezing, hemoptysis; No shortness of breath  CARDIOVASCULAR: No chest pain or palpitations  GASTROINTESTINAL: No abdominal or epigastric pain. No nausea, vomiting, or hematemesis; No diarrhea or constipation. No melena or hematochezia.  GENITOURINARY: No dysuria, frequency or hematuria  NEUROLOGICAL: No numbness or weakness  SKIN: No itching, burning, rashes, or lesions   All other review of systems is negative unless indicated above.      Vital Signs:  Vital Signs Last 24 Hrs  T(C): 36.6 (2023 23:55), Max: 36.6 (2023 22:45)  T(F): 97.9 (2023 23:55), Max: 97.9 (2023 22:45)  HR: 89 (2023 01:43) (78 - 98)  BP: 140/84 (2023 01:12) (140/84 - 184/96)  BP(mean): --  RR: 17 (2023 23:55) (17 - 17)  SpO2: 99% (2023 01:38) (90% - 100%)    Parameters below as of 2023 23:55  Patient On (Oxygen Delivery Method): room air      Height (cm): 155 (23 @ 23:55)  Weight (kg): 67.1 (23 @ 23:55)  BMI (kg/m2): 27.9 (23 @ 23:55)  BSA (m2): 1.66 (23 @ 23:55)    Gen: NAD, well-appearing  Heart: RRR  Lungs: CTAB  Abd: soft, gravid  Ext: 2+ pitting edema- BLE, non-tender   SVE: Deferred     FHT: 140 baseline, moderate variability, +accels, no decels  [place]: no ctx   Bed side sono: Breech, fundal placenta, BRIGIDA 9.31cm, EFW approximately 970g    Labs:                          10.5   10.25 )-----------( 239      ( 2023 23:11 )             31.4         135  |  103  |  9.4  ----------------------------<  104<H>  4.0   |  19.0<L>  |  0.44<L>    Ca    9.1      2023 23:11    TPro  5.8<L>  /  Alb  3.3  /  TBili  <0.2<L>  /  DBili  x   /  AST  18  /  ALT  9   /  AlkPhos  101      PT/INR - ( 2023 23:11 )   PT: 10.0 sec;   INR: 0.86 ratio         PTT - ( 2023 23:11 )  PTT:28.1 sec        MEDICATIONS  (STANDING):  betamethasone Injectable 12 milliGRAM(s) IntraMuscular every 24 hours  magnesium sulfate Infusion 2 Gm/Hr (50 mL/Hr) IV Continuous <Continuous>  prenatal multivitamin 1 Tablet(s) Oral daily

## 2023-02-24 NOTE — OB RN PATIENT PROFILE - FALL HARM RISK - UNIVERSAL INTERVENTIONS
Bed in lowest position, wheels locked, appropriate side rails in place/Call bell, personal items and telephone in reach/Instruct patient to call for assistance before getting out of bed or chair/Non-slip footwear when patient is out of bed/[place] to call system/Physically safe environment - no spills, clutter or unnecessary equipment/Purposeful Proactive Rounding/Room/bathroom lighting operational, light cord in reach

## 2023-02-24 NOTE — OB PROVIDER H&P - NSHPPHYSICALEXAM_GEN_ALL_CORE
T(C): 36.6 (02-23-23 @ 23:55), Max: 36.6 (02-23-23 @ 22:45)  HR: 81 (02-24-23 @ 01:09) (80 - 98)  BP: 140/84 (02-24-23 @ 01:12) (140/84 - 184/96)  RR: 17 (02-23-23 @ 23:55) (17 - 17)  SpO2: 99% (02-24-23 @ 01:09) (90% - 100%)  Gen: NAD, well-appearing  Heart: RRR  Lungs: CTAB  Abd: soft, gravid  Ext: non-edematous, non-tender   SVE: Deferred     FHT: 140 baseline, moderate variability, +accels, no decels  [place]: no ctx   Bed side sono: Breech, fundal placenta, BRIGIDA 9.31cm T(C): 36.6 (02-23-23 @ 23:55), Max: 36.6 (02-23-23 @ 22:45)  HR: 81 (02-24-23 @ 01:09) (80 - 98)  BP: 140/84 (02-24-23 @ 01:12) (140/84 - 184/96)  RR: 17 (02-23-23 @ 23:55) (17 - 17)  SpO2: 99% (02-24-23 @ 01:09) (90% - 100%)  Gen: NAD, well-appearing  Heart: RRR  Lungs: CTAB  Abd: soft, gravid  Ext: non-edematous, non-tender   SVE: Deferred     FHT: 140 baseline, moderate variability, +accels, no decels  [place]: no ctx   Bed side sono: Breech, fundal placenta, BRIGIDA 9.31cm, EFW approximately 970g T(C): 36.6 (02-23-23 @ 23:55), Max: 36.6 (02-23-23 @ 22:45)  HR: 81 (02-24-23 @ 01:09) (80 - 98)  BP: 140/84 (02-24-23 @ 01:12) (140/84 - 184/96)  RR: 17 (02-23-23 @ 23:55) (17 - 17)  SpO2: 99% (02-24-23 @ 01:09) (90% - 100%)  Gen: NAD, well-appearing  Heart: RRR  Lungs: CTAB  Abd: soft, gravid  Ext: 2+ pitting edema- BLE, non-tender   SVE: Deferred     FHT: 140 baseline, moderate variability, +accels, no decels  [place]: no ctx   Bed side sono: Breech, fundal placenta, BRIGIDA 9.31cm, EFW approximately 970g

## 2023-02-24 NOTE — OB PROVIDER H&P - ASSESSMENT
A/P:   CHRIS GREENWOOD is a 37y  at weeks GA who presents to L&D with severe range BP's. S/p IV labetolol 20mg + 40mg+ 80mg.   Admit to antepartum service for Preeclampsia with severe features.  Consent   PIH labs   MgSO4 x 24 hours   Betamethasone  Continuous toco and fetal monitoring   MFM consult      Dr. Carcamo present at bedside    A/P:   CHRIS GREENWOOD is a 37y  at 28weeks 5days GA who presents to L&D with severe range BP's. S/p IV labetolol 20mg + 40mg+ 80mg.   Admit to antepartum service for Preeclampsia with severe features.  Consent   PIH labs   MgSO4 x 24 hours   Betamethasone  Continuous toco and fetal monitoring   MFM consult      Dr. Carcamo present at bedside

## 2023-02-24 NOTE — OB PROVIDER H&P - HISTORY OF PRESENT ILLNESS
CHRIS GREENWOOD is a 37y  at weeks GA who presents to L&D with elevated BP's at home. And one week of new onset lower extremity edema.   Pt denies vaginal bleeding, contractions and leakage of fluid. She endorses good fetal movement.   Pt denies headaches, visual disturbances, RUQ pain, epigastric pain, or SOB.     Pregnancy course is  uncomplicated.     POB: none  PGYN: -fibroids/-cysts, denied STD hx, denies abnormal PAPs  PMH: none  PSH: none   SH: Denies tobacco use, EtOH use and illicit drug use during the pregnancy; Feels safe at home  Meds: PNV  All: NKDA      CHRIS GREENWOOD is a 37y  at 28 weeks 5days GA who presents to L&D with elevated BP's at home. And one week of new onset lower extremity edema.   Pt denies vaginal bleeding, contractions and leakage of fluid. She endorses good fetal movement.   Pt denies headaches, visual disturbances, RUQ pain, epigastric pain, or SOB.     Pregnancy course is  uncomplicated.     POB: none  PGYN: -fibroids/-cysts, denied STD hx, denies abnormal PAPs  PMH: none  PSH: none   SH: Denies tobacco use, EtOH use and illicit drug use during the pregnancy; Feels safe at home  Meds: PNV  All: NKDA

## 2023-02-24 NOTE — CHART NOTE - NSCHARTNOTEFT_GEN_A_CORE
Subjective/Objective:  · Subjective/Objective:	Transfer Note    CHRIS GREENWOOD is a 37y  at 28.6 weeks GA who presents to L&D with elevated BP's at home. And one week of new onset lower extremity edema.   Pt denies vaginal bleeding, contractions and leakage of fluid. She endorses good fetal movement.   Pt denies headaches, visual disturbances, RUQ pain, epigastric pain, or SOB.     Pregnancy course is complicated by severe pre-eclampsia requiring Labetalol push 20/40/80 and now Procardia XL 30 mg x1 at 2 am. She was started on Magnesium drip and is tolerating well.   Growth scan demonstrates breech presentation at <1% 945 gm (26 week gestation), anterior placenta, elevated dopplers with intermittent absent end diastolic flow    POB: none  PGYN: -fibroids/-cysts, denied STD hx, denies abnormal PAPs  PMH: none  PSH: none   SH: Denies tobacco use, EtOH use and illicit drug use during the pregnancy; Feels safe at home  Meds: PNV  All: NKDA                        11.4   11.94 )-----------( 242      ( 2023 06:30 )             33.7     02-24    134<L>  |  104  |  8.1  ----------------------------<  130<H>  4.5   |  19.0<L>  |  0.47<L>    Ca    8.7      2023 06:30  Mg     6.0     02-24    TPro  6.3<L>  /  Alb  3.4  /  TBili  <0.2<L>  /  DBili  x   /  AST  16  /  ALT  9   /  AlkPhos  99  02-24        Urinalysis Basic - ( 2023 23:11 )    Color: Yellow / Appearance: Clear / S.005 / pH: x  Gluc: x / Ketone: Negative  / Bili: Negative / Urobili: Negative mg/dL   Blood: x / Protein: 100 / Nitrite: Negative   Leuk Esterase: Trace / RBC: 0-2 /HPF / WBC 0-2 /HPF   Sq Epi: x / Non Sq Epi: Occasional / Bacteria: Occasional    PT/INR - ( 2023 23:11 )   PT: 10.0 sec;   INR: 0.86 ratio      PTT - ( 2023 06:30 )  PTT:28.2 sec  	   FHR/Contractions Pattern Details:  · FHR Details:	120 moderate variability, no decelerations  · Contractions Pattern Details:	None    · Assessment	  36 yo  at 28.6 weeks presents with severe pre-eclampsia requiring emergent antihypertensive medication, fetus with severe growth restriction and elevated dopplers but reassuring fetal heart tracing no s/s pre-term labor. Consent for transfer to [place] signed after questions answered thru .  -continue Magnesium infusion, no s/s toxicity  -reassuring fetal testing  -s/p betamethasone x1  -breech presentation  -[facility] Elisabet arndt MFM Fellow [place]

## 2023-02-24 NOTE — OB PROVIDER H&P - NSLOWPPHRISK_OBGYN_A_OB
No previous uterine incision/Bernstein Pregnancy/Less than or equal to 4 previous vaginal births/No known bleeding disorder/No history of postpartum hemorrhage

## 2023-02-24 NOTE — OB PROVIDER H&P - NSHPPHYSICALEXAM_GEN_ALL_CORE
Vital Signs Last 24 Hours  T(C): 36.5 (02-24-23 @ 06:30), Max: 36.6 (02-23-23 @ 22:45)  HR: 80 (02-24-23 @ 13:09) (67 - 98)  BP: 157/88 (02-24-23 @ 13:09) (128/75 - 184/96)  RR: 17 (02-23-23 @ 23:55) (17 - 17)  SpO2: 98% (02-24-23 @ 13:08) (87% - 100%)      Physical Exam:  General: NAD  Abdomen: Soft, non-tender, gravid  Ext: No pain or swelling

## 2023-02-24 NOTE — CONSULT NOTE ADULT - ATTENDING COMMENTS
MFM Attending    37 year old  at 28 6/7 weeks admitted with severe range blood pressures yesterday, diagnosed with preeclampsia with severe features, received IV push labetalol 20/40/80, started betamethasone course, IV magnesium sulfate, and nifedipine XL 30 mg. No prior history of hypertension. Pregnancy previously uncomplicated per patient. On ultrasound today, found to have fetus with EFW <1st percentile with intermittent AEDV. Due to early gestational age and EFW, decision made to transfer patient to tertiary center for NICU care.    TYREE Jacob

## 2023-02-24 NOTE — OB PROVIDER H&P - HISTORY OF PRESENT ILLNESS
[place] [phone].       R3 Admission Note    36yo  at 28w6d GA transferred from [facility] for management of severe preeclampsia. Patient presented to [facility] with elevated blood pressures requiring Labetalol IVP 20/40/80mg and was started on Procardia 30XL. MgSO4 gtt started for seizure ppx. Scan at [facility] demonstrated breech presentation with severe IUGR (945g <1%ile) with elevated dopplers, intermittent AEDV. Patient was transferred to [facility] for further management.     Patient seen and evaluated on arrival. Denies HA, visual changes, RUQ/epigastric pain, CP, SOB.  Pt reports +FM, denies LOF, VB, ctx.     POB: none  PGYN: -fibroids/-cysts, denied STD hx, denies abnormal PAPs  PMH: none  PSH: none   SH: Denies tobacco use, EtOH use and illicit drug use during the pregnancy; Feels safe at home  Meds: PNV  All: NKDA             R3 Admission Note    36yo  at 28w6d GA transferred from [facility] for management of severe preeclampsia. Patient presented to [facility] with elevated blood pressures requiring Labetalol IVP 20/40/80mg and was started on Procardia 30XL. MgSO4 gtt started for seizure ppx. Scan at [facility] demonstrated breech presentation with severe IUGR (945g <1%ile) with elevated dopplers, intermittent AEDV. Patient was transferred to [facility] for further management.     Patient seen and evaluated on arrival. Denies HA, visual changes, RUQ/epigastric pain, CP, SOB.  Pt reports +FM, denies LOF, VB, ctx.     POB: none  PGYN: -cysts, denied STD hx, denies abnormal PAPs  PMH: none  PSH: none   SH: Denies tobacco use, EtOH use and illicit drug use during the pregnancy; Feels safe at home  Meds: PNV  All: NKDA

## 2023-02-24 NOTE — OB PROVIDER H&P - ATTENDING COMMENTS
MFM Attending     Pt evaluated and counseled with the MFM team.  Agree with the assessment and plan above.    Ms. Parsons is a 36yo G1 @ 28 6/7 weeks transferred from [facility] because of  preeclampsia with severe features and IUGR with abnormal Doppler studies.    The patient denies PEC sxs.  BPs elevated but not in severe range since transfer.  The EFM is reactive with moderate variability.  HELLP labs at the referring hospital normal but very elevated urine P:C.  Our ultrasound confirms IUGR at 2nd percentile with elevated UA Dopplers.    Agree with the plan above - Continue magnesium for seizure prophylaxis at this time, 2nd dose of betamethasone tonight, blood pressure monitoring and titrate the nifedipine as needed, serial laboratory studies.  Fetal monitoring with continuous EFM at this time.     Will reassess for candidacy for expectant management tomorrow - will d/c magnesium if so.      The patient has been consented for  for delivery (currently breech presentation), possible classical.  For intraop evaluation to confirm suspected fibroids with cystic degeneration.  NICU consult requested.

## 2023-02-24 NOTE — OB PROVIDER H&P - NSHPLABSRESULTS_GEN_ALL_CORE
Protein/Creatinine Ratio, Urine (02.23.23 @ 23:11)    Creatinine, Random Urine: 13: Reference Ranges have NOT been established for random urine analytes due  to variability in fluid intake and concentration. mg/dL    Total Protein, Random Urine: 184.0 mg/dL    Protein/Creatinine Ratio Calculation: 14.2 Ratio                          10.5   10.25 )-----------( 239      ( 23 Feb 2023 23:11 )             31.4       02-23    135  |  103  |  9.4  ----------------------------<  104<H>  4.0   |  19.0<L>  |  0.44<L>    Ca    9.1      23 Feb 2023 23:11    TPro  5.8<L>  /  Alb  3.3  /  TBili  <0.2<L>  /  DBili  x   /  AST  18  /  ALT  9   /  AlkPhos  101  02-23

## 2023-02-24 NOTE — OB PROVIDER H&P - ATTENDING COMMENTS
Patient admitted at 28 weeks 5days with preeclampsia with severe features.  Presents to triage with new onset high blood pressure and worsening edema.  The patient denies HA/vision changes/RUQ pain.  Per patient pregnancy has been uncomplicated otherwise.  Receives care with Dr Grossman.   On presentation, BPs were persistently severe.  Requiring labetalol 20mg, 40mg and 80mg IVP, and magnesium initiated.  Laboratory results significant for Pr/Cr ratio 14.  On bedside sono suspect FGR (unofficial EFW 970g), breech presentation.    I reviewed the findings and diagnosis with the patient.  We will admit for magnesium for seizure prophylaxis, and blood pressure management. Betamethasone for fetal lung maturity.  MFM consulted.  May require transfer for higher level of care due to prematurity.    Fetal testing otherwise reassuring.

## 2023-02-24 NOTE — OB RN PATIENT PROFILE - FUNCTIONAL ASSESSMENT - BASIC MOBILITY 6.
Not able to assess (calculate score using AMPAC averaging method) 4-calculated by average /Not able to assess (calculate score using [facility] averaging method)

## 2023-02-25 LAB
ALBUMIN SERPL ELPH-MCNC: 3.5 G/DL — SIGNIFICANT CHANGE UP (ref 3.3–5)
ALP SERPL-CCNC: 101 U/L — SIGNIFICANT CHANGE UP (ref 40–120)
ALT FLD-CCNC: 11 U/L — SIGNIFICANT CHANGE UP (ref 10–45)
ANION GAP SERPL CALC-SCNC: 16 MMOL/L — SIGNIFICANT CHANGE UP (ref 5–17)
APTT BLD: 24.8 SEC — LOW (ref 27.5–35.5)
AST SERPL-CCNC: 16 U/L — SIGNIFICANT CHANGE UP (ref 10–40)
BASOPHILS # BLD AUTO: 0 K/UL — SIGNIFICANT CHANGE UP (ref 0–0.2)
BASOPHILS NFR BLD AUTO: 0 % — SIGNIFICANT CHANGE UP (ref 0–2)
BILIRUB SERPL-MCNC: 0.2 MG/DL — SIGNIFICANT CHANGE UP (ref 0.2–1.2)
BUN SERPL-MCNC: 12 MG/DL — SIGNIFICANT CHANGE UP (ref 7–23)
CALCIUM SERPL-MCNC: 7.4 MG/DL — LOW (ref 8.4–10.5)
CHLORIDE SERPL-SCNC: 99 MMOL/L — SIGNIFICANT CHANGE UP (ref 96–108)
CO2 SERPL-SCNC: 17 MMOL/L — LOW (ref 22–31)
COVID-19 SPIKE DOMAIN AB INTERP: POSITIVE
COVID-19 SPIKE DOMAIN ANTIBODY RESULT: >250 U/ML — HIGH
CREAT SERPL-MCNC: 0.6 MG/DL — SIGNIFICANT CHANGE UP (ref 0.5–1.3)
EGFR: 120 ML/MIN/1.73M2 — SIGNIFICANT CHANGE UP
EOSINOPHIL # BLD AUTO: 0 K/UL — SIGNIFICANT CHANGE UP (ref 0–0.5)
EOSINOPHIL NFR BLD AUTO: 0 % — SIGNIFICANT CHANGE UP (ref 0–6)
FIBRINOGEN PPP-MCNC: 474 MG/DL — HIGH (ref 200–445)
GLUCOSE SERPL-MCNC: 142 MG/DL — HIGH (ref 70–99)
HCT VFR BLD CALC: 34.3 % — LOW (ref 34.5–45)
HGB BLD-MCNC: 11.4 G/DL — LOW (ref 11.5–15.5)
INR BLD: 0.83 RATIO — LOW (ref 0.88–1.16)
LDH SERPL L TO P-CCNC: 235 U/L — SIGNIFICANT CHANGE UP (ref 50–242)
LYMPHOCYTES # BLD AUTO: 0.74 K/UL — LOW (ref 1–3.3)
LYMPHOCYTES # BLD AUTO: 4.3 % — LOW (ref 13–44)
MAGNESIUM SERPL-MCNC: 7.6 MG/DL — HIGH (ref 1.6–2.6)
MAGNESIUM SERPL-MCNC: 7.8 MG/DL — HIGH (ref 1.6–2.6)
MANUAL SMEAR VERIFICATION: SIGNIFICANT CHANGE UP
MCHC RBC-ENTMCNC: 29.9 PG — SIGNIFICANT CHANGE UP (ref 27–34)
MCHC RBC-ENTMCNC: 33.2 GM/DL — SIGNIFICANT CHANGE UP (ref 32–36)
MCV RBC AUTO: 90 FL — SIGNIFICANT CHANGE UP (ref 80–100)
MONOCYTES # BLD AUTO: 0.16 K/UL — SIGNIFICANT CHANGE UP (ref 0–0.9)
MONOCYTES NFR BLD AUTO: 0.9 % — LOW (ref 2–14)
NEUTROPHILS # BLD AUTO: 16.41 K/UL — HIGH (ref 1.8–7.4)
NEUTROPHILS NFR BLD AUTO: 94.8 % — HIGH (ref 43–77)
PLAT MORPH BLD: NORMAL — SIGNIFICANT CHANGE UP
PLATELET # BLD AUTO: 268 K/UL — SIGNIFICANT CHANGE UP (ref 150–400)
POTASSIUM SERPL-MCNC: 4.3 MMOL/L — SIGNIFICANT CHANGE UP (ref 3.5–5.3)
POTASSIUM SERPL-SCNC: 4.3 MMOL/L — SIGNIFICANT CHANGE UP (ref 3.5–5.3)
PROT SERPL-MCNC: 6.6 G/DL — SIGNIFICANT CHANGE UP (ref 6–8.3)
PROTHROM AB SERPL-ACNC: 9.5 SEC — LOW (ref 10.5–13.4)
RBC # BLD: 3.81 M/UL — SIGNIFICANT CHANGE UP (ref 3.8–5.2)
RBC # FLD: 14.6 % — HIGH (ref 10.3–14.5)
RBC BLD AUTO: SIGNIFICANT CHANGE UP
RUBV IGG SER-ACNC: 7.8 INDEX — SIGNIFICANT CHANGE UP
RUBV IGG SER-IMP: POSITIVE — SIGNIFICANT CHANGE UP
SARS-COV-2 IGG+IGM SERPL QL IA: >250 U/ML — HIGH
SARS-COV-2 IGG+IGM SERPL QL IA: POSITIVE
SODIUM SERPL-SCNC: 132 MMOL/L — LOW (ref 135–145)
T PALLIDUM AB TITR SER: NEGATIVE — SIGNIFICANT CHANGE UP
URATE SERPL-MCNC: 5.3 MG/DL — SIGNIFICANT CHANGE UP (ref 2.5–7)
WBC # BLD: 17.31 K/UL — HIGH (ref 3.8–10.5)
WBC # FLD AUTO: 17.31 K/UL — HIGH (ref 3.8–10.5)

## 2023-02-25 PROCEDURE — 99231 SBSQ HOSP IP/OBS SF/LOW 25: CPT

## 2023-02-25 RX ORDER — HEPARIN SODIUM 5000 [USP'U]/ML
5000 INJECTION INTRAVENOUS; SUBCUTANEOUS EVERY 12 HOURS
Refills: 0 | Status: DISCONTINUED | OUTPATIENT
Start: 2023-02-25 | End: 2023-03-04

## 2023-02-25 RX ORDER — NIFEDIPINE 30 MG
30 TABLET, EXTENDED RELEASE 24 HR ORAL EVERY 12 HOURS
Refills: 0 | Status: DISCONTINUED | OUTPATIENT
Start: 2023-02-25 | End: 2023-02-27

## 2023-02-25 RX ORDER — ASPIRIN/CALCIUM CARB/MAGNESIUM 324 MG
162 TABLET ORAL DAILY
Refills: 0 | Status: DISCONTINUED | OUTPATIENT
Start: 2023-02-25 | End: 2023-02-26

## 2023-02-25 RX ADMIN — Medication 1 TABLET(S): at 11:51

## 2023-02-25 RX ADMIN — Medication 162 MILLIGRAM(S): at 11:51

## 2023-02-25 RX ADMIN — HEPARIN SODIUM 5000 UNIT(S): 5000 INJECTION INTRAVENOUS; SUBCUTANEOUS at 17:39

## 2023-02-25 RX ADMIN — Medication 30 MILLIGRAM(S): at 21:10

## 2023-02-25 RX ADMIN — Medication 30 MILLIGRAM(S): at 09:41

## 2023-02-25 NOTE — PROGRESS NOTE ADULT - SUBJECTIVE AND OBJECTIVE BOX
R3 OB Antepartum Progress Note    Patient seen and examined at bedside, no acute overnight events. No acute complaints. Patient denies contractions, vaginal bleeding, leakage of fluid. Reports good fetal movement. Patient is ambulating and tolerating regular diet. Denies CP, SOB, N/V, fevers, chills, or any other concerns.    Vital Signs Last 24 Hours  T(C): 36.7 (02-25-23 @ 03:33), Max: 36.8 (02-24-23 @ 14:48)  HR: 83 (02-25-23 @ 04:28) (67 - 102)  BP: 146/74 (02-25-23 @ 04:28) (121/68 - 169/93)  RR: 20 (02-24-23 @ 15:44) (20 - 20)  SpO2: 93% (02-25-23 @ 04:27) (89% - 100%)    I&O's Summary  24 Feb 2023 07:01  -  25 Feb 2023 04:31  --------------------------------------------------------  IN: 700 mL / OUT: 1700 mL / NET: -1000 mL    Physical Exam:  General: NAD  CV: RR  Lungs: breathing comfortably on RA  Abdomen: soft, gravid, non-distended, non-tender  Ext: no pain or swelling    Labs:             11.2<L>  14.62<H> )-----------( 257      ( 02-24 @ 17:34 )             33.6<L>               11.4<L>  11.94<H> )-----------( 242      ( 02-24 @ 06:30 )             33.7<L>               10.5<L>  10.25 )-----------( 239      ( 02-23 @ 23:11 )             31.4<L>        MEDICATIONS  (STANDING):  chlorhexidine 2% Cloths 1 Application(s) Topical once  citric acid/sodium citrate Solution 15 milliLiter(s) Oral every 6 hours  lactated ringers. 1000 milliLiter(s) (125 mL/Hr) IV Continuous <Continuous>  magnesium sulfate Infusion 2 Gm/Hr (50 mL/Hr) IV Continuous <Continuous>  NIFEdipine XL 30 milliGRAM(s) Oral daily  oxytocin Infusion 333.333 milliUNIT(s)/Min (1000 mL/Hr) IV Continuous <Continuous>

## 2023-02-25 NOTE — PROGRESS NOTE ADULT - ASSESSMENT
36yo  @29w0d transferred from [facility] for admission to the antepartum service for pre-eclampsia with severe features and fetal growth restriction. Blood pressures mild range with intermittent non-sustained severe range BPs overnight. Patient currently in stable condition, asymptomatic, maternal + fetal status reassuring.     #PEC w/ severe features  - HELLP labs wnl       -- f/u AM HELLP labs  - P/C: 14.2  - MgSO4 (-) for seizure ppx  - Procardia 30XL qD       -- s/p Labetalol 20/40/80 prior to transfer   - Con't to monitor BPs    #Fetal Well Being  - Fetal growth restriction (EFW 2%ile, AC 2%ile)       -- ATU (): breech, anterior placenta, BRIGIDA 15.94, EFW 995g (2%, AC 2%), UAD elevated, 4.8cm posterior myoma       -- Serial growth US       -- Twice weekly BPP/Dopplers  - Continuous EFM, [place]  - s/p BMZ (-) for fetal lung maturity  - MgSO4 (-) for neuroprotection  - f/u GBS culture results  - Appreciate NICU consult    #Maternal Wellbeing  - Con't PNVs  - NPO  - LR@125  - Con't ASA  - PNVs  - SCDs for DVT ppx     [place] PGY3

## 2023-02-26 LAB
GROUP B BETA STREP DNA (PCR): DETECTED
SOURCE GROUP B STREP: SIGNIFICANT CHANGE UP

## 2023-02-26 PROCEDURE — 99231 SBSQ HOSP IP/OBS SF/LOW 25: CPT

## 2023-02-26 RX ADMIN — HEPARIN SODIUM 5000 UNIT(S): 5000 INJECTION INTRAVENOUS; SUBCUTANEOUS at 21:06

## 2023-02-26 RX ADMIN — HEPARIN SODIUM 5000 UNIT(S): 5000 INJECTION INTRAVENOUS; SUBCUTANEOUS at 06:12

## 2023-02-26 RX ADMIN — Medication 30 MILLIGRAM(S): at 21:07

## 2023-02-26 RX ADMIN — Medication 30 MILLIGRAM(S): at 09:13

## 2023-02-26 RX ADMIN — Medication 1 TABLET(S): at 11:32

## 2023-02-26 NOTE — PROGRESS NOTE ADULT - SUBJECTIVE AND OBJECTIVE BOX
R3 Antepartum Note, HD#3    Interval events:    Patient seen and examined at bedside, no acute overnight events. No acute complaints. Pt reports +FM, denies LOF, VB, ctx, HA, epigastric pain, blurred vision, CP, SOB, N/V, fevers, and chills.    Vital Signs Last 24 Hours  T(C): 36.7 (02-26-23 @ 01:05), Max: 36.7 (02-25-23 @ 03:33)  HR: 92 (02-26-23 @ 01:05) (75 - 102)  BP: 146/81 (02-26-23 @ 01:05) (133/72 - 169/93)  RR: 18 (02-26-23 @ 01:05) (18 - 18)  SpO2: 96% (02-26-23 @ 01:05) (90% - 100%)    CAPILLARY BLOOD GLUCOSE          Physical Exam:  General: NAD  Abdomen: Soft, non-tender, gravid  Ext: No pain or swelling    NST reactive overnight    Labs:             11.4   17.31 )-----------( 268      ( 02-25 @ 05:55 )             34.3     02-25 @ 05:55    132  |  99  |  12  ----------------------------<  142  4.3   |  17  |  0.60    Ca    7.4      02-25 @ 05:55  Mg     7.8     02-25 @ 05:55    TPro  6.6  /  Alb  3.5  /  TBili  0.2  /  DBili  x   /  AST  16  /  ALT  11  /  AlkPhos  101  02-25 @ 05:55    PT/INR - ( 02-25 @ 05:55 )   PT: 9.5 sec;   INR: 0.83 ratio    PTT - ( 02-25 @ 05:55 )  PTT:24.8 sec    Uric Acid: (02-25 @ 05:55)  5.3      Fibrinogen: (02-25 @ 05:55)  --       LDH: (02-25 @ 05:55)  235        MEDICATIONS  (STANDING):  aspirin  chewable 162 milliGRAM(s) Oral daily  heparin   Injectable 5000 Unit(s) SubCutaneous every 12 hours  NIFEdipine XL 30 milliGRAM(s) Oral every 12 hours  oxytocin Infusion 333.333 milliUNIT(s)/Min (1000 mL/Hr) IV Continuous <Continuous>  prenatal multivitamin 1 Tablet(s) Oral daily    MEDICATIONS  (PRN):

## 2023-02-26 NOTE — PROGRESS NOTE ADULT - ASSESSMENT
36yo  @29w1d transferred from [facility] for admission to the antepartum service for pre-eclampsia with severe features and fetal growth restriction. Blood pressures remain mild range. Patient currently in stable condition, asymptomatic, maternal + fetal status reassuring.     #PEC w/ severe features  - HELLP labs wnl  - P/C: 14.2  - MgSO4 () for seizure ppx  - Procardia 30XL qD       -- s/p Labetalol 20/40/80 prior to transfer   - Con't to monitor BPs    -#Fetal growth restriction (EFW 2%ile, AC 2%ile)       -- ATU (): breech, anterior placenta, BRIGIDA 15.94, EFW 995g (2%, AC 2%), UAD elevated, 4.8cm posterior myoma       -- Serial growth US       -- Twice weekly BPP/Dopplers    #Fetal Well Being  - Continuous EFM, [place]  - s/p BMZ () for fetal lung maturity  - MgSO4 () for neuroprotection  - f/u GBS   - Appreciate NICU consult    #Maternal Wellbeing  - Con't PNVs, ASA  - Reg  - SLIV  - HSQ, SCDs for DVT ppx     Magdalena Alvarado MD PGY3

## 2023-02-27 ENCOUNTER — APPOINTMENT (OUTPATIENT)
Dept: ANTEPARTUM | Facility: CLINIC | Age: 38
End: 2023-02-27
Payer: MEDICAID

## 2023-02-27 ENCOUNTER — ASOB RESULT (OUTPATIENT)
Age: 38
End: 2023-02-27

## 2023-02-27 PROBLEM — Z00.00 ENCOUNTER FOR PREVENTIVE HEALTH EXAMINATION: Status: ACTIVE | Noted: 2023-02-27

## 2023-02-27 PROBLEM — Z78.9 OTHER SPECIFIED HEALTH STATUS: Chronic | Status: ACTIVE | Noted: 2023-02-24

## 2023-02-27 LAB
ALBUMIN SERPL ELPH-MCNC: 3.2 G/DL — LOW (ref 3.3–5)
ALP SERPL-CCNC: 89 U/L — SIGNIFICANT CHANGE UP (ref 40–120)
ALT FLD-CCNC: 12 U/L — SIGNIFICANT CHANGE UP (ref 10–45)
ANION GAP SERPL CALC-SCNC: 11 MMOL/L — SIGNIFICANT CHANGE UP (ref 5–17)
APTT BLD: 29.3 SEC — SIGNIFICANT CHANGE UP (ref 27.5–35.5)
AST SERPL-CCNC: 18 U/L — SIGNIFICANT CHANGE UP (ref 10–40)
BASOPHILS # BLD AUTO: 0.05 K/UL — SIGNIFICANT CHANGE UP (ref 0–0.2)
BASOPHILS NFR BLD AUTO: 0.4 % — SIGNIFICANT CHANGE UP (ref 0–2)
BILIRUB SERPL-MCNC: 0.1 MG/DL — LOW (ref 0.2–1.2)
BLD GP AB SCN SERPL QL: NEGATIVE — SIGNIFICANT CHANGE UP
BUN SERPL-MCNC: 26 MG/DL — HIGH (ref 7–23)
CALCIUM SERPL-MCNC: 8.2 MG/DL — LOW (ref 8.4–10.5)
CHLORIDE SERPL-SCNC: 103 MMOL/L — SIGNIFICANT CHANGE UP (ref 96–108)
CO2 SERPL-SCNC: 21 MMOL/L — LOW (ref 22–31)
CREAT SERPL-MCNC: 0.68 MG/DL — SIGNIFICANT CHANGE UP (ref 0.5–1.3)
EGFR: 116 ML/MIN/1.73M2 — SIGNIFICANT CHANGE UP
EOSINOPHIL # BLD AUTO: 0.19 K/UL — SIGNIFICANT CHANGE UP (ref 0–0.5)
EOSINOPHIL NFR BLD AUTO: 1.7 % — SIGNIFICANT CHANGE UP (ref 0–6)
FIBRINOGEN PPP-MCNC: 349 MG/DL — SIGNIFICANT CHANGE UP (ref 200–445)
GLUCOSE SERPL-MCNC: 79 MG/DL — SIGNIFICANT CHANGE UP (ref 70–99)
HCT VFR BLD CALC: 30.5 % — LOW (ref 34.5–45)
HGB BLD-MCNC: 9.9 G/DL — LOW (ref 11.5–15.5)
IMM GRANULOCYTES NFR BLD AUTO: 2.1 % — HIGH (ref 0–0.9)
INR BLD: 0.81 RATIO — LOW (ref 0.88–1.16)
LDH SERPL L TO P-CCNC: 202 U/L — SIGNIFICANT CHANGE UP (ref 50–242)
LYMPHOCYTES # BLD AUTO: 1.64 K/UL — SIGNIFICANT CHANGE UP (ref 1–3.3)
LYMPHOCYTES # BLD AUTO: 14.2 % — SIGNIFICANT CHANGE UP (ref 13–44)
MCHC RBC-ENTMCNC: 29.5 PG — SIGNIFICANT CHANGE UP (ref 27–34)
MCHC RBC-ENTMCNC: 32.5 GM/DL — SIGNIFICANT CHANGE UP (ref 32–36)
MCV RBC AUTO: 90.8 FL — SIGNIFICANT CHANGE UP (ref 80–100)
MONOCYTES # BLD AUTO: 1.17 K/UL — HIGH (ref 0–0.9)
MONOCYTES NFR BLD AUTO: 10.2 % — SIGNIFICANT CHANGE UP (ref 2–14)
NEUTROPHILS # BLD AUTO: 8.22 K/UL — HIGH (ref 1.8–7.4)
NEUTROPHILS NFR BLD AUTO: 71.4 % — SIGNIFICANT CHANGE UP (ref 43–77)
NRBC # BLD: 0 /100 WBCS — SIGNIFICANT CHANGE UP (ref 0–0)
PLATELET # BLD AUTO: 205 K/UL — SIGNIFICANT CHANGE UP (ref 150–400)
POTASSIUM SERPL-MCNC: 4.9 MMOL/L — SIGNIFICANT CHANGE UP (ref 3.5–5.3)
POTASSIUM SERPL-SCNC: 4.9 MMOL/L — SIGNIFICANT CHANGE UP (ref 3.5–5.3)
PROT SERPL-MCNC: 5.6 G/DL — LOW (ref 6–8.3)
PROTHROM AB SERPL-ACNC: 9.3 SEC — LOW (ref 10.5–13.4)
RBC # BLD: 3.36 M/UL — LOW (ref 3.8–5.2)
RBC # FLD: 14.3 % — SIGNIFICANT CHANGE UP (ref 10.3–14.5)
RH IG SCN BLD-IMP: POSITIVE — SIGNIFICANT CHANGE UP
SODIUM SERPL-SCNC: 135 MMOL/L — SIGNIFICANT CHANGE UP (ref 135–145)
URATE SERPL-MCNC: 6.9 MG/DL — SIGNIFICANT CHANGE UP (ref 2.5–7)
WBC # BLD: 11.51 K/UL — HIGH (ref 3.8–10.5)
WBC # FLD AUTO: 11.51 K/UL — HIGH (ref 3.8–10.5)

## 2023-02-27 PROCEDURE — 76818 FETAL BIOPHYS PROFILE W/NST: CPT | Mod: 26

## 2023-02-27 PROCEDURE — 76820 UMBILICAL ARTERY ECHO: CPT | Mod: 26

## 2023-02-27 RX ORDER — NIFEDIPINE 30 MG
30 TABLET, EXTENDED RELEASE 24 HR ORAL
Refills: 0 | Status: DISCONTINUED | OUTPATIENT
Start: 2023-02-27 | End: 2023-02-27

## 2023-02-27 RX ORDER — NIFEDIPINE 30 MG
30 TABLET, EXTENDED RELEASE 24 HR ORAL
Refills: 0 | Status: DISCONTINUED | OUTPATIENT
Start: 2023-02-27 | End: 2023-03-01

## 2023-02-27 RX ORDER — NIFEDIPINE 30 MG
60 TABLET, EXTENDED RELEASE 24 HR ORAL
Refills: 0 | Status: DISCONTINUED | OUTPATIENT
Start: 2023-02-27 | End: 2023-02-27

## 2023-02-27 RX ORDER — NIFEDIPINE 30 MG
10 TABLET, EXTENDED RELEASE 24 HR ORAL ONCE
Refills: 0 | Status: COMPLETED | OUTPATIENT
Start: 2023-02-27 | End: 2023-02-27

## 2023-02-27 RX ORDER — NIFEDIPINE 30 MG
60 TABLET, EXTENDED RELEASE 24 HR ORAL
Refills: 0 | Status: DISCONTINUED | OUTPATIENT
Start: 2023-02-27 | End: 2023-03-01

## 2023-02-27 RX ADMIN — HEPARIN SODIUM 5000 UNIT(S): 5000 INJECTION INTRAVENOUS; SUBCUTANEOUS at 20:58

## 2023-02-27 RX ADMIN — Medication 30 MILLIGRAM(S): at 09:17

## 2023-02-27 RX ADMIN — Medication 10 MILLIGRAM(S): at 17:48

## 2023-02-27 RX ADMIN — Medication 30 MILLIGRAM(S): at 20:58

## 2023-02-27 RX ADMIN — HEPARIN SODIUM 5000 UNIT(S): 5000 INJECTION INTRAVENOUS; SUBCUTANEOUS at 09:17

## 2023-02-27 RX ADMIN — Medication 1 TABLET(S): at 12:08

## 2023-02-27 RX ADMIN — Medication 10 MILLIGRAM(S): at 17:17

## 2023-02-27 NOTE — PROGRESS NOTE ADULT - ASSESSMENT
36yo  @29w2d transferred from [facility] for admission to the antepartum service for pre-eclampsia with severe features and fetal growth restriction. Blood pressures remain mild range. Patient currently in stable condition, asymptomatic, maternal + fetal status reassuring.     #PEC w/ severe features  - HELLP labs wnl  - P/C: 14.2  - MgSO4 () for seizure ppx  - Procardia 30XL BID       -- s/p Labetalol 20/40/80 prior to transfer   - Con't to monitor BPs    #Fetal growth restriction (EFW 2%ile, AC 2%ile)  - ATU (): breech, anterior placenta, BRIGIDA 15.94, EFW 995g (2%, AC 2%), UAD elevated, 4.8cm posterior myoma  - Serial growth US  - Twice weekly BPP/Dopplers    #Fetal Well Being  - Continuous EFM, [place]  - s/p BMZ () for fetal lung maturity  - MgSO4 () for neuroprotection  - GBS positive    - Appreciate NICU consult    #Maternal Wellbeing  - Con't PNVs, ASA  - Reg  - SLIV  - HSQ, SCDs for DVT ppx     Magdalena Alvarado MD PGY3

## 2023-02-27 NOTE — PROGRESS NOTE ADULT - SUBJECTIVE AND OBJECTIVE BOX
R3 Antepartum Note, HD#5    [place]  ID#[id number]    Interval events:    Patient seen and examined at bedside, no acute overnight events. No acute complaints. Pt reports +FM, denies LOF, VB, ctx, HA, epigastric pain, blurred vision, CP, SOB, N/V, fevers, and chills.    Vital Signs Last 24 Hours  T(C): 36.6 (02-27-23 @ 05:30), Max: 36.6 (02-27-23 @ 05:30)  HR: 77 (02-27-23 @ 05:30) (73 - 86)  BP: 134/80 (02-27-23 @ 05:30) (130/85 - 159/95)  RR: 18 (02-27-23 @ 05:30) (18 - 18)  SpO2: 95% (02-27-23 @ 05:30) (95% - 98%)    CAPILLARY BLOOD GLUCOSE      Physical Exam:  General: NAD  Abdomen: Soft, non-tender, gravid  Ext: No pain or swelling    NST reactive overnight    Labs:             9.9    11.51 )-----------( 205      ( 02-27 @ 05:57 )             30.5     02-27 @ 05:57    135  |  103  |  26  ----------------------------<  79  4.9   |  21  |  0.68    Ca    8.2      02-27 @ 05:57    TPro  5.6  /  Alb  3.2  /  TBili  0.1  /  DBili  x   /  AST  18  /  ALT  12  /  AlkPhos  89  02-27 @ 05:57    PT/INR - ( 02-27 @ 05:57 )   PT: 9.3 sec;   INR: 0.81 ratio    PTT - ( 02-27 @ 05:57 )  PTT:29.3 sec    Uric Acid: (02-27 @ 05:57)  6.9      Fibrinogen: (02-27 @ 05:57)  --       LDH: (02-27 @ 05:57)  202        MEDICATIONS  (STANDING):  heparin   Injectable 5000 Unit(s) SubCutaneous every 12 hours  NIFEdipine XL 30 milliGRAM(s) Oral every 12 hours  oxytocin Infusion 333.333 milliUNIT(s)/Min (1000 mL/Hr) IV Continuous <Continuous>  prenatal multivitamin 1 Tablet(s) Oral daily    MEDICATIONS  (PRN):

## 2023-02-28 PROCEDURE — 99232 SBSQ HOSP IP/OBS MODERATE 35: CPT

## 2023-02-28 RX ORDER — ACETAMINOPHEN 500 MG
975 TABLET ORAL ONCE
Refills: 0 | Status: COMPLETED | OUTPATIENT
Start: 2023-02-28 | End: 2023-02-28

## 2023-02-28 RX ORDER — METOCLOPRAMIDE HCL 10 MG
10 TABLET ORAL ONCE
Refills: 0 | Status: COMPLETED | OUTPATIENT
Start: 2023-02-28 | End: 2023-02-28

## 2023-02-28 RX ORDER — DIPHENHYDRAMINE HCL 50 MG
25 CAPSULE ORAL ONCE
Refills: 0 | Status: COMPLETED | OUTPATIENT
Start: 2023-02-28 | End: 2023-02-28

## 2023-02-28 RX ADMIN — Medication 1 TABLET(S): at 12:38

## 2023-02-28 RX ADMIN — Medication 10 MILLIGRAM(S): at 17:01

## 2023-02-28 RX ADMIN — Medication 25 MILLIGRAM(S): at 17:00

## 2023-02-28 RX ADMIN — Medication 60 MILLIGRAM(S): at 08:31

## 2023-02-28 RX ADMIN — HEPARIN SODIUM 5000 UNIT(S): 5000 INJECTION INTRAVENOUS; SUBCUTANEOUS at 08:32

## 2023-02-28 RX ADMIN — Medication 975 MILLIGRAM(S): at 15:38

## 2023-02-28 NOTE — PROGRESS NOTE ADULT - ASSESSMENT
36yo  @29w3d transferred from [facility] for admission to the antepartum service for pre-eclampsia with severe features and fetal growth restriction. Intermittently elevated blood pressures over last 24hrs requiring IR Procardia, standing regimen uptitrated. Patient currently in stable condition, asymptomatic, maternal + fetal status reassuring.     #PEC w/ severe features  - HELLP labs wnl  - P/C: 14.2  - MgSO4 (-) for seizure ppx  - Procardia 30XL BID       -- s/p Labetalol 20/40/80 prior to transfer       -- s/p Procardia 10 IR ()    - Con't to monitor BPs    #Fetal growth restriction (EFW 2%ile, AC 2%ile)  - ATU (): breech, anterior placenta, BRIGIDA 15.94, EFW 995g (2%, AC 2%), UAD elevated, 4.8cm posterior myoma  - Serial growth US  - Twice weekly BPP/Dopplers    #Fetal Well Being  - Continuous EFM, [place]  - s/p BMZ (-) for fetal lung maturity  - MgSO4 () for neuroprotection  - GBS positive    - Appreciate NICU consult    #Maternal Wellbeing  - Con't PNVs, ASA  - Reg  - SLIV  - HSQ, SCDs for DVT ppx     Magdalena Alvarado MD PGY3

## 2023-02-28 NOTE — PROGRESS NOTE ADULT - SUBJECTIVE AND OBJECTIVE BOX
R3 Antepartum Note, HD#6    Interval events:    Patient seen and examined at bedside, no acute overnight events. No acute complaints. Pt reports +FM, denies LOF, VB, ctx, HA, epigastric pain, blurred vision, CP, SOB, N/V, fevers, and chills.    Vital Signs Last 24 Hours  T(C): 36.5 (02-28-23 @ 06:26), Max: 37.1 (02-28-23 @ 00:26)  HR: 73 (02-28-23 @ 06:26) (73 - 101)  BP: 139/76 (02-28-23 @ 06:26) (139/76 - 161/98)  RR: 18 (02-28-23 @ 06:26) (18 - 18)  SpO2: 96% (02-28-23 @ 06:26) (95% - 99%)    CAPILLARY BLOOD GLUCOSE          Physical Exam:  General: NAD  Abdomen: Soft, non-tender, gravid  Ext: No pain or swelling    NST reactive overnight    Labs:             9.9    11.51 )-----------( 205      ( 02-27 @ 05:57 )             30.5     02-27 @ 05:57    135  |  103  |  26  ----------------------------<  79  4.9   |  21  |  0.68    Ca    8.2      02-27 @ 05:57    TPro  5.6  /  Alb  3.2  /  TBili  0.1  /  DBili  x   /  AST  18  /  ALT  12  /  AlkPhos  89  02-27 @ 05:57    PT/INR - ( 02-27 @ 05:57 )   PT: 9.3 sec;   INR: 0.81 ratio    PTT - ( 02-27 @ 05:57 )  PTT:29.3 sec    Uric Acid: (02-27 @ 05:57)  6.9      Fibrinogen: (02-27 @ 05:57)  --       LDH: (02-27 @ 05:57)  202        MEDICATIONS  (STANDING):  heparin   Injectable 5000 Unit(s) SubCutaneous every 12 hours  NIFEdipine XL 30 milliGRAM(s) Oral <User Schedule>  NIFEdipine XL 60 milliGRAM(s) Oral <User Schedule>  oxytocin Infusion 333.333 milliUNIT(s)/Min (1000 mL/Hr) IV Continuous <Continuous>  prenatal multivitamin 1 Tablet(s) Oral daily    MEDICATIONS  (PRN):

## 2023-03-01 ENCOUNTER — ASOB RESULT (OUTPATIENT)
Age: 38
End: 2023-03-01

## 2023-03-01 ENCOUNTER — APPOINTMENT (OUTPATIENT)
Dept: ANTEPARTUM | Facility: CLINIC | Age: 38
End: 2023-03-01
Payer: MEDICAID

## 2023-03-01 LAB
ALBUMIN SERPL ELPH-MCNC: 3.2 G/DL — LOW (ref 3.3–5)
ALP SERPL-CCNC: 133 U/L — HIGH (ref 40–120)
ALT FLD-CCNC: 18 U/L — SIGNIFICANT CHANGE UP (ref 10–45)
ANION GAP SERPL CALC-SCNC: 12 MMOL/L — SIGNIFICANT CHANGE UP (ref 5–17)
APTT BLD: 29.4 SEC — SIGNIFICANT CHANGE UP (ref 27.5–35.5)
AST SERPL-CCNC: 24 U/L — SIGNIFICANT CHANGE UP (ref 10–40)
BASOPHILS # BLD AUTO: 0.07 K/UL — SIGNIFICANT CHANGE UP (ref 0–0.2)
BASOPHILS NFR BLD AUTO: 0.6 % — SIGNIFICANT CHANGE UP (ref 0–2)
BILIRUB SERPL-MCNC: 0.1 MG/DL — LOW (ref 0.2–1.2)
BUN SERPL-MCNC: 23 MG/DL — SIGNIFICANT CHANGE UP (ref 7–23)
CALCIUM SERPL-MCNC: 9 MG/DL — SIGNIFICANT CHANGE UP (ref 8.4–10.5)
CHLORIDE SERPL-SCNC: 105 MMOL/L — SIGNIFICANT CHANGE UP (ref 96–108)
CO2 SERPL-SCNC: 18 MMOL/L — LOW (ref 22–31)
CREAT SERPL-MCNC: 0.77 MG/DL — SIGNIFICANT CHANGE UP (ref 0.5–1.3)
EGFR: 103 ML/MIN/1.73M2 — SIGNIFICANT CHANGE UP
EOSINOPHIL # BLD AUTO: 0.33 K/UL — SIGNIFICANT CHANGE UP (ref 0–0.5)
EOSINOPHIL NFR BLD AUTO: 2.8 % — SIGNIFICANT CHANGE UP (ref 0–6)
GLUCOSE SERPL-MCNC: 108 MG/DL — HIGH (ref 70–99)
HCT VFR BLD CALC: 34.3 % — LOW (ref 34.5–45)
HGB BLD-MCNC: 11.5 G/DL — SIGNIFICANT CHANGE UP (ref 11.5–15.5)
IMM GRANULOCYTES NFR BLD AUTO: 2.3 % — HIGH (ref 0–0.9)
INR BLD: 0.82 RATIO — LOW (ref 0.88–1.16)
LDH SERPL L TO P-CCNC: 277 U/L — HIGH (ref 50–242)
LYMPHOCYTES # BLD AUTO: 1.56 K/UL — SIGNIFICANT CHANGE UP (ref 1–3.3)
LYMPHOCYTES # BLD AUTO: 13.3 % — SIGNIFICANT CHANGE UP (ref 13–44)
MCHC RBC-ENTMCNC: 30.4 PG — SIGNIFICANT CHANGE UP (ref 27–34)
MCHC RBC-ENTMCNC: 33.5 GM/DL — SIGNIFICANT CHANGE UP (ref 32–36)
MCV RBC AUTO: 90.7 FL — SIGNIFICANT CHANGE UP (ref 80–100)
MONOCYTES # BLD AUTO: 0.82 K/UL — SIGNIFICANT CHANGE UP (ref 0–0.9)
MONOCYTES NFR BLD AUTO: 7 % — SIGNIFICANT CHANGE UP (ref 2–14)
NEUTROPHILS # BLD AUTO: 8.67 K/UL — HIGH (ref 1.8–7.4)
NEUTROPHILS NFR BLD AUTO: 74 % — SIGNIFICANT CHANGE UP (ref 43–77)
NRBC # BLD: 0 /100 WBCS — SIGNIFICANT CHANGE UP (ref 0–0)
PLATELET # BLD AUTO: 225 K/UL — SIGNIFICANT CHANGE UP (ref 150–400)
POTASSIUM SERPL-MCNC: 4.6 MMOL/L — SIGNIFICANT CHANGE UP (ref 3.5–5.3)
POTASSIUM SERPL-SCNC: 4.6 MMOL/L — SIGNIFICANT CHANGE UP (ref 3.5–5.3)
PROT SERPL-MCNC: 6.2 G/DL — SIGNIFICANT CHANGE UP (ref 6–8.3)
PROTHROM AB SERPL-ACNC: 9.5 SEC — LOW (ref 10.5–13.4)
RBC # BLD: 3.78 M/UL — LOW (ref 3.8–5.2)
RBC # FLD: 14.2 % — SIGNIFICANT CHANGE UP (ref 10.3–14.5)
SODIUM SERPL-SCNC: 135 MMOL/L — SIGNIFICANT CHANGE UP (ref 135–145)
URATE SERPL-MCNC: 6.9 MG/DL — SIGNIFICANT CHANGE UP (ref 2.5–7)
WBC # BLD: 11.72 K/UL — HIGH (ref 3.8–10.5)
WBC # FLD AUTO: 11.72 K/UL — HIGH (ref 3.8–10.5)

## 2023-03-01 PROCEDURE — 76820 UMBILICAL ARTERY ECHO: CPT | Mod: 26

## 2023-03-01 PROCEDURE — 76818 FETAL BIOPHYS PROFILE W/NST: CPT | Mod: 26

## 2023-03-01 PROCEDURE — 99232 SBSQ HOSP IP/OBS MODERATE 35: CPT

## 2023-03-01 RX ORDER — NIFEDIPINE 30 MG
60 TABLET, EXTENDED RELEASE 24 HR ORAL EVERY 12 HOURS
Refills: 0 | Status: DISCONTINUED | OUTPATIENT
Start: 2023-03-01 | End: 2023-03-09

## 2023-03-01 RX ORDER — LABETALOL HCL 100 MG
200 TABLET ORAL EVERY 8 HOURS
Refills: 0 | Status: DISCONTINUED | OUTPATIENT
Start: 2023-03-01 | End: 2023-03-04

## 2023-03-01 RX ORDER — ACETAMINOPHEN 500 MG
975 TABLET ORAL ONCE
Refills: 0 | Status: COMPLETED | OUTPATIENT
Start: 2023-03-01 | End: 2023-03-01

## 2023-03-01 RX ADMIN — HEPARIN SODIUM 5000 UNIT(S): 5000 INJECTION INTRAVENOUS; SUBCUTANEOUS at 20:23

## 2023-03-01 RX ADMIN — Medication 200 MILLIGRAM(S): at 22:48

## 2023-03-01 RX ADMIN — Medication 975 MILLIGRAM(S): at 23:17

## 2023-03-01 RX ADMIN — Medication 60 MILLIGRAM(S): at 20:23

## 2023-03-01 RX ADMIN — Medication 200 MILLIGRAM(S): at 14:46

## 2023-03-01 RX ADMIN — Medication 1 TABLET(S): at 12:24

## 2023-03-01 RX ADMIN — Medication 60 MILLIGRAM(S): at 09:42

## 2023-03-01 RX ADMIN — HEPARIN SODIUM 5000 UNIT(S): 5000 INJECTION INTRAVENOUS; SUBCUTANEOUS at 09:43

## 2023-03-01 NOTE — PROGRESS NOTE ADULT - SUBJECTIVE AND OBJECTIVE BOX
R3 Antepartum Note, HD#7    Interval events:    Patient seen and examined at bedside. Yesterday, patient with headache resolved with tylenol/benadryl/reglan. No acute complaints. Pt reports +FM, denies LOF, VB, ctx, HA, epigastric pain, blurred vision, CP, SOB, N/V, fevers, and chills.    Vital Signs Last 24 Hours  T(C): 36.7 (03-01-23 @ 05:57), Max: 36.8 (02-28-23 @ 12:38)  HR: 82 (03-01-23 @ 05:57) (66 - 100)  BP: 154/99 (03-01-23 @ 05:57) (137/83 - 160/97)  RR: 18 (03-01-23 @ 05:57) (18 - 18)  SpO2: 95% (03-01-23 @ 05:57) (95% - 97%)    CAPILLARY BLOOD GLUCOSE          Physical Exam:  General: NAD  Abdomen: Soft, non-tender, gravid  Ext: No pain or swelling    NST reactive overnight    Labs:        PT/INR - ( 02-27 @ 05:57 )   PT: 9.3 sec;   INR: 0.81 ratio    PTT - ( 02-27 @ 05:57 )  PTT:29.3 sec    Uric Acid: (02-27 @ 05:57)  6.9      Fibrinogen: (02-27 @ 05:57)  --       LDH: (02-27 @ 05:57)  202        MEDICATIONS  (STANDING):  heparin   Injectable 5000 Unit(s) SubCutaneous every 12 hours  NIFEdipine XL 30 milliGRAM(s) Oral <User Schedule>  NIFEdipine XL 60 milliGRAM(s) Oral <User Schedule>  oxytocin Infusion 333.333 milliUNIT(s)/Min (1000 mL/Hr) IV Continuous <Continuous>  prenatal multivitamin 1 Tablet(s) Oral daily    MEDICATIONS  (PRN):

## 2023-03-01 NOTE — PROGRESS NOTE ADULT - ASSESSMENT
38yo  @29w4d transferred from [facility] for admission to the antepartum service for pre-eclampsia with severe features and fetal growth restriction. Patient currently in stable condition, asymptomatic, maternal + fetal status reassuring. Blood pressures intermittently elevated, continuing to uptitrate medication.     #PEC w/ severe features  - HELLP labs wnl  - P/C: 14.2  - MgSO4 () for seizure ppx  - Procardia 30XL BID       -- s/p Labetalol 20/40/80 prior to transfer       -- s/p Procardia 10/10 IR ()    - Con't to monitor BPs    #Fetal growth restriction (EFW 2%ile, AC 2%ile)  - ATU (): breech, anterior placenta, BRIGIDA 15.94, EFW 995g (2%, AC 2%), UAD elevated, 4.8cm posterior myoma  - Serial growth US  - Twice weekly BPP/Dopplers  - NST BID     #Fetal Well Being  - Continuous EFM, [place]  - s/p BMZ (-) for fetal lung maturity  - MgSO4 () for neuroprotection  - GBS positive    - Appreciate NICU consult    #Maternal Wellbeing  - Con't PNVs, ASA  - Reg  - SLIV  - HSQ, SCDs for DVT ppx     Magdalena Alvarado MD PGY3 38yo  @29w4d transferred from [facility] for admission to the antepartum service for pre-eclampsia with severe features and fetal growth restriction. Patient currently in stable condition, asymptomatic, maternal + fetal status reassuring. Blood pressures intermittently elevated, continuing to uptitrate medication.     #PEC w/ severe features  - HELLP labs wnl  - P/C: 14.2  - MgSO4 () for seizure ppx  - Procardia 30XL BID       -- s/p Labetalol 20/40/80 prior to transfer       -- s/p Procardia 10/10 IR ()    - Con't to monitor BPs    #Fetal growth restriction (EFW 2%ile, AC 2%ile)  - ATU (): breech, anterior placenta, BRIGIDA 15.94, EFW 995g (2%, AC 2%), UAD elevated, 4.8cm posterior myoma  - Serial growth US  - Twice weekly BPP/Dopplers  - NST BID     #Fetal Well Being  - Continuous EFM, [place]  - s/p BMZ () for fetal lung maturity  - MgSO4 () for neuroprotection  - GBS positive    - Appreciate NICU consult    #Maternal Wellbeing  - Con't PNVs, ASA  - Reg  - SLIV  - HSQ, SCDs for DVT ppx     Magdalena Alvarado MD PGY3    MFM Addendum  38yo  @29w4d admitted with pre-eclampsia with severe features and fetal growth restriction with elevated UAD. Patient with increasing BPs overnight with multiple 150s/90s, will plan to increase medication from procardia 60/30 to 60/60XL. Patient denies headache, vision changes or RUQ pain. Patient is feeling the baby move without issue and fetal heart rate tracing category 1. Plan to draw labs today given increase in blood pressures. Additionally, patient for BPP today.     Patient seen and examined with CAROLYN Nuñez Attending  Carly Hirschberg, MFM Fellow

## 2023-03-02 LAB
ALBUMIN SERPL ELPH-MCNC: 2.8 G/DL — LOW (ref 3.3–5)
ALP SERPL-CCNC: 110 U/L — SIGNIFICANT CHANGE UP (ref 40–120)
ALT FLD-CCNC: 15 U/L — SIGNIFICANT CHANGE UP (ref 10–45)
ANION GAP SERPL CALC-SCNC: 13 MMOL/L — SIGNIFICANT CHANGE UP (ref 5–17)
APTT BLD: 29.9 SEC — SIGNIFICANT CHANGE UP (ref 27.5–35.5)
AST SERPL-CCNC: 20 U/L — SIGNIFICANT CHANGE UP (ref 10–40)
BASOPHILS # BLD AUTO: 0.07 K/UL — SIGNIFICANT CHANGE UP (ref 0–0.2)
BASOPHILS NFR BLD AUTO: 0.7 % — SIGNIFICANT CHANGE UP (ref 0–2)
BILIRUB SERPL-MCNC: <0.1 MG/DL — LOW (ref 0.2–1.2)
BLD GP AB SCN SERPL QL: NEGATIVE — SIGNIFICANT CHANGE UP
BUN SERPL-MCNC: 23 MG/DL — SIGNIFICANT CHANGE UP (ref 7–23)
CALCIUM SERPL-MCNC: 8.3 MG/DL — LOW (ref 8.4–10.5)
CHLORIDE SERPL-SCNC: 105 MMOL/L — SIGNIFICANT CHANGE UP (ref 96–108)
CO2 SERPL-SCNC: 17 MMOL/L — LOW (ref 22–31)
CREAT SERPL-MCNC: 0.77 MG/DL — SIGNIFICANT CHANGE UP (ref 0.5–1.3)
EGFR: 103 ML/MIN/1.73M2 — SIGNIFICANT CHANGE UP
EOSINOPHIL # BLD AUTO: 0.36 K/UL — SIGNIFICANT CHANGE UP (ref 0–0.5)
EOSINOPHIL NFR BLD AUTO: 3.4 % — SIGNIFICANT CHANGE UP (ref 0–6)
FIBRINOGEN PPP-MCNC: 617 MG/DL — HIGH (ref 200–445)
GLUCOSE SERPL-MCNC: 73 MG/DL — SIGNIFICANT CHANGE UP (ref 70–99)
HCT VFR BLD CALC: 30.5 % — LOW (ref 34.5–45)
HGB BLD-MCNC: 9.9 G/DL — LOW (ref 11.5–15.5)
IMM GRANULOCYTES NFR BLD AUTO: 1.8 % — HIGH (ref 0–0.9)
INR BLD: 0.83 RATIO — LOW (ref 0.88–1.16)
LDH SERPL L TO P-CCNC: 235 U/L — SIGNIFICANT CHANGE UP (ref 50–242)
LYMPHOCYTES # BLD AUTO: 1.87 K/UL — SIGNIFICANT CHANGE UP (ref 1–3.3)
LYMPHOCYTES # BLD AUTO: 17.7 % — SIGNIFICANT CHANGE UP (ref 13–44)
MCHC RBC-ENTMCNC: 29.1 PG — SIGNIFICANT CHANGE UP (ref 27–34)
MCHC RBC-ENTMCNC: 32.5 GM/DL — SIGNIFICANT CHANGE UP (ref 32–36)
MCV RBC AUTO: 89.7 FL — SIGNIFICANT CHANGE UP (ref 80–100)
MONOCYTES # BLD AUTO: 0.75 K/UL — SIGNIFICANT CHANGE UP (ref 0–0.9)
MONOCYTES NFR BLD AUTO: 7.1 % — SIGNIFICANT CHANGE UP (ref 2–14)
NEUTROPHILS # BLD AUTO: 7.33 K/UL — SIGNIFICANT CHANGE UP (ref 1.8–7.4)
NEUTROPHILS NFR BLD AUTO: 69.3 % — SIGNIFICANT CHANGE UP (ref 43–77)
NRBC # BLD: 0 /100 WBCS — SIGNIFICANT CHANGE UP (ref 0–0)
PLATELET # BLD AUTO: 192 K/UL — SIGNIFICANT CHANGE UP (ref 150–400)
POTASSIUM SERPL-MCNC: 5.3 MMOL/L — SIGNIFICANT CHANGE UP (ref 3.5–5.3)
POTASSIUM SERPL-SCNC: 5.3 MMOL/L — SIGNIFICANT CHANGE UP (ref 3.5–5.3)
PROT SERPL-MCNC: 5.5 G/DL — LOW (ref 6–8.3)
PROTHROM AB SERPL-ACNC: 9.5 SEC — LOW (ref 10.5–13.4)
RBC # BLD: 3.4 M/UL — LOW (ref 3.8–5.2)
RBC # FLD: 14.4 % — SIGNIFICANT CHANGE UP (ref 10.3–14.5)
RH IG SCN BLD-IMP: POSITIVE — SIGNIFICANT CHANGE UP
SODIUM SERPL-SCNC: 135 MMOL/L — SIGNIFICANT CHANGE UP (ref 135–145)
URATE SERPL-MCNC: 7 MG/DL — SIGNIFICANT CHANGE UP (ref 2.5–7)
WBC # BLD: 10.57 K/UL — HIGH (ref 3.8–10.5)
WBC # FLD AUTO: 10.57 K/UL — HIGH (ref 3.8–10.5)

## 2023-03-02 PROCEDURE — 99232 SBSQ HOSP IP/OBS MODERATE 35: CPT

## 2023-03-02 RX ORDER — DIPHENHYDRAMINE HCL 50 MG
25 CAPSULE ORAL ONCE
Refills: 0 | Status: COMPLETED | OUTPATIENT
Start: 2023-03-02 | End: 2023-03-02

## 2023-03-02 RX ORDER — METOCLOPRAMIDE HCL 10 MG
10 TABLET ORAL ONCE
Refills: 0 | Status: DISCONTINUED | OUTPATIENT
Start: 2023-03-02 | End: 2023-03-05

## 2023-03-02 RX ORDER — ACETAMINOPHEN 500 MG
975 TABLET ORAL ONCE
Refills: 0 | Status: COMPLETED | OUTPATIENT
Start: 2023-03-02 | End: 2023-03-02

## 2023-03-02 RX ADMIN — Medication 200 MILLIGRAM(S): at 06:32

## 2023-03-02 RX ADMIN — HEPARIN SODIUM 5000 UNIT(S): 5000 INJECTION INTRAVENOUS; SUBCUTANEOUS at 09:30

## 2023-03-02 RX ADMIN — Medication 200 MILLIGRAM(S): at 15:00

## 2023-03-02 RX ADMIN — Medication 975 MILLIGRAM(S): at 09:00

## 2023-03-02 RX ADMIN — Medication 200 MILLIGRAM(S): at 22:21

## 2023-03-02 RX ADMIN — Medication 975 MILLIGRAM(S): at 08:05

## 2023-03-02 RX ADMIN — Medication 60 MILLIGRAM(S): at 09:30

## 2023-03-02 RX ADMIN — Medication 25 MILLIGRAM(S): at 08:05

## 2023-03-02 RX ADMIN — Medication 975 MILLIGRAM(S): at 00:45

## 2023-03-02 RX ADMIN — Medication 1 TABLET(S): at 12:32

## 2023-03-02 NOTE — PROGRESS NOTE ADULT - ASSESSMENT
38yo  @29w5d transferred from [facility] for admission to the antepartum service for pre-eclampsia with severe features and fetal growth restriction. Patient currently in stable condition, asymptomatic, maternal + fetal status reassuring. Blood pressures intermittently elevated, continuing to uptitrate medication. Patient complaining of mild HA, treating at this time to ensure resolution.     #PEC w/ severe features  - HELLP labs wnl  - P/C: 14.2  - MgSO4 (-) for seizure ppx  - Procardia 30XL BID       -- s/p Labetalol 20/40/80 prior to transfer       -- s/p Procardia 10/10 IR ()    - Con't to monitor BPs  - Tylenol, reglan, benadryl for HA then reassess     #Fetal growth restriction (EFW 2%ile, AC 2%ile)  - ATU (): breech, anterior placenta, BRIGIDA 15.94, EFW 995g (2%, AC 2%), UAD elevated, 4.8cm posterior myoma  - Most recent dopplers 3/1 remain elevated   - Serial growth US  - Twice weekly BPP/Dopplers  - NST BID     #Fetal Well Being  - Continuous EFM, [place]  - s/p BMZ (-) for fetal lung maturity  - MgSO4 () for neuroprotection  - GBS positive    - Appreciate NICU consult    #Maternal Wellbeing  - Con't PNVs, ASA  - Reg  - SLIV  - HSQ, SCDs for DVT ppx     Magdalena Alvarado MD PGY3 38yo  @29w5d transferred from [facility] for admission to the antepartum service for pre-eclampsia with severe features and fetal growth restriction. Patient currently in stable condition, asymptomatic, maternal + fetal status reassuring. Blood pressures intermittently elevated, continuing to uptitrate medication. Patient complaining of mild HA, treating at this time to ensure resolution.     #PEC w/ severe features  - HELLP labs wnl  - P/C: 14.2  - MgSO4 (-) for seizure ppx  - Procardia 30XL BID       -- s/p Labetalol 20/40/80 prior to transfer       -- s/p Procardia 10/10 IR ()    - Con't to monitor BPs  - Tylenol, reglan, benadryl for HA then reassess     #Fetal growth restriction (EFW 2%ile, AC 2%ile)  - ATU (): breech, anterior placenta, BRIGIDA 15.94, EFW 995g (2%, AC 2%), UAD elevated, 4.8cm posterior myoma  - Most recent dopplers 3/1 remain elevated   - Serial growth US  - Twice weekly BPP/Dopplers  - NST BID     #Fetal Well Being  - Continuous EFM, [place]  - s/p BMZ (-) for fetal lung maturity  - MgSO4 () for neuroprotection  - GBS positive    - Appreciate NICU consult    #Maternal Wellbeing  - Con't PNVs, ASA  - Reg  - SLIV  - HSQ, SCDs for DVT ppx     Magdalena Alvarado MD PGY3    MFM Addendum  38yo  @29w5d admitted with pre-eclampsia with severe features and fetal growth restriction with elevated UAD. Patient with increasing BPs yesterday and therefore had medications increased to labetalol 200mg TID and procardia 60XL BID. Since up titrating medications, BPs improved. Overnight patient with headache and received tylenol/benadryl this morning. At time of rounds this morning, patient states that headache improved and is now resolved. At this time she denies headache, vision changes or RUQ pain. Patient is feeling the baby move without issue and fetal heart rate tracing category 1. PEC labs sent yesterday and wnl and BPP done yesterday .    Patient seen and examined with CAROLYN Nuñez Attending  Carly Hirschberg, MFM Fellow

## 2023-03-02 NOTE — PROGRESS NOTE ADULT - SUBJECTIVE AND OBJECTIVE BOX
R3 Antepartum Note, HD#8    Interval events:    Patient seen and examined at bedside, no acute overnight events. Reporting mild HA this AM. Pt reports +FM, denies LOF, VB, ctx, epigastric pain, blurred vision, CP, SOB, N/V, fevers, and chills.    Vital Signs Last 24 Hours  T(C): 36.4 (03-02-23 @ 06:41), Max: 36.7 (03-01-23 @ 09:00)  HR: 73 (03-02-23 @ 06:41) (73 - 89)  BP: 151/95 (03-02-23 @ 06:41) (136/86 - 170/93)  RR: 17 (03-02-23 @ 06:41) (17 - 18)  SpO2: 97% (03-02-23 @ 06:41) (95% - 97%)    CAPILLARY BLOOD GLUCOSE          Physical Exam:  General: NAD  Abdomen: Soft, non-tender, gravid  Ext: No pain or swelling    NST reactive overnight    Labs:             11.5   11.72 )-----------( 225      ( 03-01 @ 14:34 )             34.3     03-01 @ 14:34    135  |  105  |  23  ----------------------------<  108  4.6   |  18  |  0.77    Ca    9.0      03-01 @ 14:34    TPro  6.2  /  Alb  3.2  /  TBili  0.1  /  DBili  x   /  AST  24  /  ALT  18  /  AlkPhos  133  03-01 @ 14:34    PT/INR - ( 03-01 @ 14:34 )   PT: 9.5 sec;   INR: 0.82 ratio    PTT - ( 03-01 @ 14:34 )  PTT:29.4 sec    Uric Acid: (03-01 @ 14:34)  6.9      Fibrinogen: (03-01 @ 14:34)  --       LDH: (03-01 @ 14:34)  277        MEDICATIONS  (STANDING):  heparin   Injectable 5000 Unit(s) SubCutaneous every 12 hours  labetalol 200 milliGRAM(s) Oral every 8 hours  NIFEdipine XL 60 milliGRAM(s) Oral every 12 hours  oxytocin Infusion 333.333 milliUNIT(s)/Min (1000 mL/Hr) IV Continuous <Continuous>  prenatal multivitamin 1 Tablet(s) Oral daily    MEDICATIONS  (PRN):

## 2023-03-03 ENCOUNTER — ASOB RESULT (OUTPATIENT)
Age: 38
End: 2023-03-03

## 2023-03-03 ENCOUNTER — APPOINTMENT (OUTPATIENT)
Dept: ANTEPARTUM | Facility: CLINIC | Age: 38
End: 2023-03-03
Payer: MEDICAID

## 2023-03-03 LAB — SARS-COV-2 RNA SPEC QL NAA+PROBE: DETECTED

## 2023-03-03 PROCEDURE — 99232 SBSQ HOSP IP/OBS MODERATE 35: CPT

## 2023-03-03 PROCEDURE — 76820 UMBILICAL ARTERY ECHO: CPT | Mod: 26

## 2023-03-03 PROCEDURE — 76818 FETAL BIOPHYS PROFILE W/NST: CPT | Mod: 26

## 2023-03-03 RX ADMIN — Medication 200 MILLIGRAM(S): at 22:17

## 2023-03-03 RX ADMIN — HEPARIN SODIUM 5000 UNIT(S): 5000 INJECTION INTRAVENOUS; SUBCUTANEOUS at 09:06

## 2023-03-03 RX ADMIN — Medication 1 TABLET(S): at 12:21

## 2023-03-03 RX ADMIN — Medication 200 MILLIGRAM(S): at 05:26

## 2023-03-03 RX ADMIN — HEPARIN SODIUM 5000 UNIT(S): 5000 INJECTION INTRAVENOUS; SUBCUTANEOUS at 20:30

## 2023-03-03 RX ADMIN — Medication 200 MILLIGRAM(S): at 13:12

## 2023-03-03 RX ADMIN — Medication 60 MILLIGRAM(S): at 20:30

## 2023-03-03 RX ADMIN — Medication 60 MILLIGRAM(S): at 09:06

## 2023-03-03 NOTE — PROGRESS NOTE ADULT - SUBJECTIVE AND OBJECTIVE BOX
R3 Antepartum Note, HD#9    Interval events:    Patient seen and examined at bedside, no acute overnight events. No acute complaints. Pt reports +FM, denies LOF, VB, ctx, HA, epigastric pain, blurred vision, CP, SOB, N/V, fevers, and chills.    Vital Signs Last 24 Hours  T(C): 37 (03-03-23 @ 05:20), Max: 37 (03-03-23 @ 05:20)  HR: 88 (03-03-23 @ 05:20) (73 - 88)  BP: 146/92 (03-03-23 @ 05:20) (141/89 - 158/96)  RR: 18 (03-03-23 @ 05:20) (17 - 18)  SpO2: 95% (03-03-23 @ 05:20) (95% - 98%)    CAPILLARY BLOOD GLUCOSE          Physical Exam:  General: NAD  Abdomen: Soft, non-tender, gravid  Ext: No pain or swelling    NST reactive overnight    Labs:             9.9    10.57 )-----------( 192      ( 03-02 @ 06:46 )             30.5     03-02 @ 06:46    135  |  105  |  23  ----------------------------<  73  5.3   |  17  |  0.77    Ca    8.3      03-02 @ 06:46    TPro  5.5  /  Alb  2.8  /  TBili  <0.1  /  DBili  x   /  AST  20  /  ALT  15  /  AlkPhos  110  03-02 @ 06:46    PT/INR - ( 03-02 @ 06:46 )   PT: 9.5 sec;   INR: 0.83 ratio    PTT - ( 03-02 @ 06:46 )  PTT:29.9 sec    Uric Acid: (03-02 @ 06:46)  7.0      Fibrinogen: (03-02 @ 06:46)  --       LDH: (03-02 @ 06:46)  235        MEDICATIONS  (STANDING):  heparin   Injectable 5000 Unit(s) SubCutaneous every 12 hours  labetalol 200 milliGRAM(s) Oral every 8 hours  metoclopramide Injectable 10 milliGRAM(s) IV Push once  NIFEdipine XL 60 milliGRAM(s) Oral every 12 hours  oxytocin Infusion 333.333 milliUNIT(s)/Min (1000 mL/Hr) IV Continuous <Continuous>  prenatal multivitamin 1 Tablet(s) Oral daily    MEDICATIONS  (PRN):

## 2023-03-03 NOTE — PROGRESS NOTE ADULT - ASSESSMENT
38yo  @29w6d transferred from [facility] for admission to the antepartum service for pre-eclampsia with severe features and fetal growth restriction. Patient currently in stable condition, asymptomatic, maternal + fetal status reassuring. Blood pressures intermittently elevated, improving. Patient asymptomatic at this time.     #PEC w/ severe features  - HELLP labs wnl  - P/C: 14.2  - MgSO4 () for seizure ppx  - Procardia 30XL BID, Labetalol 200mg TID        -- s/p Labetalol 20/40/80 prior to transfer       -- s/p Procardia 10/10 IR ()    - Con't to monitor BPs  - Tylenol PRN for HA      #Fetal growth restriction (EFW 2%ile, AC 2%ile)  - ATU (): breech, anterior placenta, BRIGIDA 15.94, EFW 995g (2%, AC 2%), UAD elevated, 4.8cm posterior myoma  - Most recent dopplers 3/1 remain elevated, fetus now vertex   - Serial growth US  - Twice weekly BPP/Dopplers  - NST BID     #Fetal Well Being  - Continuous EFM, [place]  - s/p BMZ (-) for fetal lung maturity  - MgSO4 () for neuroprotection  - GBS positive    - Appreciate NICU consult    #Maternal Wellbeing  - Con't PNVs, ASA  - Reg  - SLIV  - HSQ, SCDs for DVT ppx     Magdalena Alvarado MD PGY3 38yo  @29w6d transferred from [facility] for admission to the antepartum service for pre-eclampsia with severe features and fetal growth restriction. Patient currently in stable condition, asymptomatic, maternal + fetal status reassuring. Blood pressures intermittently elevated, improving. Patient asymptomatic at this time.     #PEC w/ severe features  - HELLP labs wnl       --Cr uptrending 0.44->0.77, not yet meeting criteria of 2x baseline. Cont to trend   - P/C: 14.2  - MgSO4 (-) for seizure ppx  - Procardia 30XL BID, Labetalol 200mg TID        -- s/p Labetalol 20/40/80 prior to transfer       -- s/p Procardia 10/10 IR ()    - Con't to monitor BPs  - Tylenol PRN for HA      #Fetal growth restriction (EFW 2%ile, AC 2%ile)  - ATU (): breech, anterior placenta, BRIGIDA 15.94, EFW 995g (2%, AC 2%), UAD elevated, 4.8cm posterior myoma  - Most recent dopplers 3/1 remain elevated, fetus now vertex   - Serial growth US  - Twice weekly BPP/Dopplers  - NST BID     #Fetal Well Being  - Continuous EFM, [place]  - s/p BMZ (-) for fetal lung maturity  - MgSO4 () for neuroprotection  - GBS positive    - Appreciate NICU consult    #Maternal Wellbeing  - Con't PNVs, ASA  - Reg  - SLIV  - HSQ, SCDs for DVT ppx     Magdalena Alvarado MD PGY3 38yo  @29w6d transferred from [facility] for admission to the antepartum service for pre-eclampsia with severe features and fetal growth restriction. Patient currently in stable condition, asymptomatic, maternal + fetal status reassuring. Blood pressures intermittently elevated, improving. Patient asymptomatic at this time.     #PEC w/ severe features  - HELLP labs wnl       --Cr uptrending 0.44->0.77, not yet meeting criteria of 2x baseline. Cont to trend   - P/C: 14.2  - MgSO4 (-) for seizure ppx  - Procardia 30XL BID, Labetalol 200mg TID        -- s/p Labetalol 20/40/80 prior to transfer       -- s/p Procardia 10/10 IR ()    - Con't to monitor BPs  - Tylenol PRN for HA      #Fetal growth restriction (EFW 2%ile, AC 2%ile)  - ATU (): breech, anterior placenta, BRIGIDA 15.94, EFW 995g (2%, AC 2%), UAD elevated, 4.8cm posterior myoma  - Most recent dopplers 3/1 remain elevated, fetus now vertex   - Serial growth US  - Twice weekly BPP/Dopplers  - NST BID     #Fetal Well Being  - Continuous EFM, [place]  - s/p BMZ (-) for fetal lung maturity  - MgSO4 () for neuroprotection  - GBS positive    - Appreciate NICU consult    #Maternal Wellbeing  - Con't PNVs, ASA  - Reg  - SLIV  - HSQ, SCDs for DVT ppx     Magdalena Alvarado MD PGY3    MFM Addendum  38yo  @29w6d admitted with pre-eclampsia with severe features and fetal growth restriction with elevated UAD. Patient now on labetalol 200mg TID and procardia 60XL BID with BPs in mild range. Patient denies headache, vision changes or RUQ pain. Her routine COVID swab did result as positive today. Overall patient feeling well with a mild cough/congestion. Patient is feeling the baby move without issue and fetal heart rate tracing category 1. Plan for BPP today.     Patient seen and examined with CAROLYN Nuñez Attending  Carly Hirschberg, MFM Fellow

## 2023-03-04 ENCOUNTER — TRANSCRIPTION ENCOUNTER (OUTPATIENT)
Age: 38
End: 2023-03-04

## 2023-03-04 LAB
ALBUMIN SERPL ELPH-MCNC: 2.8 G/DL — LOW (ref 3.3–5)
ALP SERPL-CCNC: 151 U/L — HIGH (ref 40–120)
ALT FLD-CCNC: 28 U/L — SIGNIFICANT CHANGE UP (ref 10–45)
ANION GAP SERPL CALC-SCNC: 11 MMOL/L — SIGNIFICANT CHANGE UP (ref 5–17)
APTT BLD: 38.6 SEC — HIGH (ref 27.5–35.5)
AST SERPL-CCNC: 31 U/L — SIGNIFICANT CHANGE UP (ref 10–40)
BASOPHILS # BLD AUTO: 0.04 K/UL — SIGNIFICANT CHANGE UP (ref 0–0.2)
BASOPHILS NFR BLD AUTO: 0.4 % — SIGNIFICANT CHANGE UP (ref 0–2)
BILIRUB SERPL-MCNC: <0.1 MG/DL — LOW (ref 0.2–1.2)
BUN SERPL-MCNC: 21 MG/DL — SIGNIFICANT CHANGE UP (ref 7–23)
CALCIUM SERPL-MCNC: 8.7 MG/DL — SIGNIFICANT CHANGE UP (ref 8.4–10.5)
CHLORIDE SERPL-SCNC: 106 MMOL/L — SIGNIFICANT CHANGE UP (ref 96–108)
CO2 SERPL-SCNC: 17 MMOL/L — LOW (ref 22–31)
CREAT SERPL-MCNC: 0.79 MG/DL — SIGNIFICANT CHANGE UP (ref 0.5–1.3)
EGFR: 100 ML/MIN/1.73M2 — SIGNIFICANT CHANGE UP
EOSINOPHIL # BLD AUTO: 0.3 K/UL — SIGNIFICANT CHANGE UP (ref 0–0.5)
EOSINOPHIL NFR BLD AUTO: 2.7 % — SIGNIFICANT CHANGE UP (ref 0–6)
FIBRINOGEN PPP-MCNC: 671 MG/DL — HIGH (ref 200–445)
GLUCOSE SERPL-MCNC: 94 MG/DL — SIGNIFICANT CHANGE UP (ref 70–99)
HCT VFR BLD CALC: 31.9 % — LOW (ref 34.5–45)
HGB BLD-MCNC: 10.7 G/DL — LOW (ref 11.5–15.5)
IMM GRANULOCYTES NFR BLD AUTO: 1.1 % — HIGH (ref 0–0.9)
INR BLD: 0.78 RATIO — LOW (ref 0.88–1.16)
LDH SERPL L TO P-CCNC: 255 U/L — HIGH (ref 50–242)
LYMPHOCYTES # BLD AUTO: 19.4 % — SIGNIFICANT CHANGE UP (ref 13–44)
LYMPHOCYTES # BLD AUTO: 2.16 K/UL — SIGNIFICANT CHANGE UP (ref 1–3.3)
MCHC RBC-ENTMCNC: 29.8 PG — SIGNIFICANT CHANGE UP (ref 27–34)
MCHC RBC-ENTMCNC: 33.5 GM/DL — SIGNIFICANT CHANGE UP (ref 32–36)
MCV RBC AUTO: 88.9 FL — SIGNIFICANT CHANGE UP (ref 80–100)
MONOCYTES # BLD AUTO: 0.83 K/UL — SIGNIFICANT CHANGE UP (ref 0–0.9)
MONOCYTES NFR BLD AUTO: 7.5 % — SIGNIFICANT CHANGE UP (ref 2–14)
NEUTROPHILS # BLD AUTO: 7.66 K/UL — HIGH (ref 1.8–7.4)
NEUTROPHILS NFR BLD AUTO: 68.9 % — SIGNIFICANT CHANGE UP (ref 43–77)
NRBC # BLD: 0 /100 WBCS — SIGNIFICANT CHANGE UP (ref 0–0)
PLATELET # BLD AUTO: 173 K/UL — SIGNIFICANT CHANGE UP (ref 150–400)
POTASSIUM SERPL-MCNC: 4.7 MMOL/L — SIGNIFICANT CHANGE UP (ref 3.5–5.3)
POTASSIUM SERPL-SCNC: 4.7 MMOL/L — SIGNIFICANT CHANGE UP (ref 3.5–5.3)
PROT SERPL-MCNC: 5.9 G/DL — LOW (ref 6–8.3)
PROTHROM AB SERPL-ACNC: 8.9 SEC — LOW (ref 10.5–13.4)
RBC # BLD: 3.59 M/UL — LOW (ref 3.8–5.2)
RBC # FLD: 14.3 % — SIGNIFICANT CHANGE UP (ref 10.3–14.5)
SODIUM SERPL-SCNC: 134 MMOL/L — LOW (ref 135–145)
URATE SERPL-MCNC: 6.3 MG/DL — SIGNIFICANT CHANGE UP (ref 2.5–7)
WBC # BLD: 11.11 K/UL — HIGH (ref 3.8–10.5)
WBC # FLD AUTO: 11.11 K/UL — HIGH (ref 3.8–10.5)

## 2023-03-04 PROCEDURE — 99232 SBSQ HOSP IP/OBS MODERATE 35: CPT

## 2023-03-04 RX ORDER — LABETALOL HCL 100 MG
20 TABLET ORAL ONCE
Refills: 0 | Status: DISCONTINUED | OUTPATIENT
Start: 2023-03-04 | End: 2023-03-04

## 2023-03-04 RX ORDER — LABETALOL HCL 100 MG
300 TABLET ORAL EVERY 8 HOURS
Refills: 0 | Status: DISCONTINUED | OUTPATIENT
Start: 2023-03-04 | End: 2023-03-06

## 2023-03-04 RX ORDER — LABETALOL HCL 100 MG
20 TABLET ORAL ONCE
Refills: 0 | Status: COMPLETED | OUTPATIENT
Start: 2023-03-04 | End: 2023-03-04

## 2023-03-04 RX ORDER — LABETALOL HCL 100 MG
100 TABLET ORAL ONCE
Refills: 0 | Status: COMPLETED | OUTPATIENT
Start: 2023-03-04 | End: 2023-03-04

## 2023-03-04 RX ORDER — NIFEDIPINE 30 MG
10 TABLET, EXTENDED RELEASE 24 HR ORAL ONCE
Refills: 0 | Status: COMPLETED | OUTPATIENT
Start: 2023-03-04 | End: 2023-03-04

## 2023-03-04 RX ORDER — MAGNESIUM SULFATE 500 MG/ML
4 VIAL (ML) INJECTION ONCE
Refills: 0 | Status: COMPLETED | OUTPATIENT
Start: 2023-03-04 | End: 2023-03-04

## 2023-03-04 RX ORDER — MAGNESIUM SULFATE 500 MG/ML
2 VIAL (ML) INJECTION
Qty: 40 | Refills: 0 | Status: DISCONTINUED | OUTPATIENT
Start: 2023-03-04 | End: 2023-03-05

## 2023-03-04 RX ADMIN — Medication 10 MILLIGRAM(S): at 09:57

## 2023-03-04 RX ADMIN — Medication 200 MILLIGRAM(S): at 05:38

## 2023-03-04 RX ADMIN — Medication 300 GRAM(S): at 22:28

## 2023-03-04 RX ADMIN — HEPARIN SODIUM 5000 UNIT(S): 5000 INJECTION INTRAVENOUS; SUBCUTANEOUS at 21:04

## 2023-03-04 RX ADMIN — Medication 60 MILLIGRAM(S): at 21:04

## 2023-03-04 RX ADMIN — Medication 12 MILLIGRAM(S): at 22:44

## 2023-03-04 RX ADMIN — Medication 60 MILLIGRAM(S): at 09:23

## 2023-03-04 RX ADMIN — HEPARIN SODIUM 5000 UNIT(S): 5000 INJECTION INTRAVENOUS; SUBCUTANEOUS at 09:23

## 2023-03-04 RX ADMIN — Medication 20 MILLIGRAM(S): at 22:25

## 2023-03-04 RX ADMIN — Medication 300 MILLIGRAM(S): at 22:06

## 2023-03-04 RX ADMIN — Medication 1 TABLET(S): at 14:11

## 2023-03-04 RX ADMIN — Medication 100 MILLIGRAM(S): at 09:57

## 2023-03-04 RX ADMIN — SODIUM CHLORIDE 50 MILLILITER(S): 9 INJECTION, SOLUTION INTRAVENOUS at 22:30

## 2023-03-04 RX ADMIN — Medication 50 GM/HR: at 22:50

## 2023-03-04 RX ADMIN — Medication 10 MILLIGRAM(S): at 21:38

## 2023-03-04 RX ADMIN — Medication 300 MILLIGRAM(S): at 14:10

## 2023-03-04 NOTE — PROVIDER CONTACT NOTE (OTHER) - ASSESSMENT
Patient denies headache, dizziness, visual changes, epigastric pain
no headache, no blurry vision no epigastric pain
no headache, no blurry vivion no epigastric pain
Pt denies headache, visual changes or epigastric pain
Pt denies any HA, vision changes, or epigastric pain.
Pt denies headache, visual changes or epigastric pain
patient denies headache, dizziness, visual changes, epigastric pain

## 2023-03-04 NOTE — PROVIDER CONTACT NOTE (OTHER) - ACTION/TREATMENT ORDERED:
give labetalol at one and repeat in hour
as above
repeat in 15 minutes
As per MD repeat in 15 mins. Will repeat VS.
Continue treatment as ordered.

## 2023-03-04 NOTE — PROVIDER CONTACT NOTE (OTHER) - RECOMMENDATIONS
repeat in 20 min
Will give Procardia 10mg IR + Labetalol 100 mg po x1 and repeat BP in 20 min
procardia given
will assess patient at bedside
Repeat BP in 15 minutes

## 2023-03-04 NOTE — PROGRESS NOTE ADULT - SUBJECTIVE AND OBJECTIVE BOX
Blood Pressure timing and antihypertensives reviewed     3/3:  Labetalol 200 @ 5am     154/92 @ 9am     Nifedipine 60XL @ 9:30am    163/100 @ 12:30pm  157/92 @ 1:00pm     Labetalol 200 @ 1pm     142/92 @ 1:30pm   138/86 @ 545pm   155/94 @ 8pm     Labetalol 200 @ 815pm   Nifed 60XL @ 9pm     Overnight no vitals     146/97 @ 5am

## 2023-03-04 NOTE — PROGRESS NOTE ADULT - SUBJECTIVE AND OBJECTIVE BOX
R3 OB Antepartum Progress Note    Patient seen and examined at bedside, no acute overnight events. No acute complaints. Patient denies contractions, vaginal bleeding, leakage of fluid. Reports good fetal movement. Patient is ambulating and tolerating regular diet. Denies CP, SOB, N/V, fevers, chills, or any other concerns.    Vital Signs Last 24 Hours  T(C): 36.6 (03-03-23 @ 20:00), Max: 37 (03-03-23 @ 05:20)  HR: 87 (03-03-23 @ 20:00) (74 - 88)  BP: 155/94 (03-03-23 @ 20:00) (138/86 - 163/100)  RR: 18 (03-03-23 @ 20:00) (15 - 18)  SpO2: 98% (03-03-23 @ 20:00) (95% - 99%)      Physical Exam:  General: NAD  CV: RR  Lungs: breathing comfortably on RA  Abdomen: soft, gravid, non-distended, non-tender  Ext: no pain or swelling    Labs:             9.9<L>  10.57<H> )-----------( 192      ( 03-02 @ 06:46 )             30.5<L>               11.5   11.72<H> )-----------( 225      ( 03-01 @ 14:34 )             34.3<L>               9.9<L>  11.51<H> )-----------( 205      ( 02-27 @ 05:57 )             30.5<L>        MEDICATIONS  (STANDING):  heparin   Injectable 5000 Unit(s) SubCutaneous every 12 hours  labetalol 200 milliGRAM(s) Oral every 8 hours  metoclopramide Injectable 10 milliGRAM(s) IV Push once  NIFEdipine XL 60 milliGRAM(s) Oral every 12 hours  oxytocin Infusion 333.333 milliUNIT(s)/Min (1000 mL/Hr) IV Continuous <Continuous>  prenatal multivitamin 1 Tablet(s) Oral daily

## 2023-03-04 NOTE — PROGRESS NOTE ADULT - ASSESSMENT
36yo  @30w0d transferred from [facility] for admission to the antepartum service for pre-eclampsia with severe features and fetal growth restriction. Blood pressures mild range overnight. Patient currently in stable condition, asymptomatic, maternal + fetal status reassuring.     #PEC w/ severe features  - HELLP labs reviewed       -- f/u AM HELLP       -- Cr uptrendin.44->0.77; con't to trend   - P/C: 14.2  - s/p MgSO4 (-) for seizure ppx  - Con't Procardia 30XL BID + Labetalol 200mg TID        -- s/p Labetalol 20/40/80 prior to transfer       -- s/p Procardia 10/10 IR ()    - Con't to monitor BPs     #Fetal growth restriction (EFW 2%ile, AC 2%ile)  - ATU (): breech, anterior placenta, BRIGIDA 15.94, EFW 995g (2%, AC 2%), UAD elevated, 4.8cm posterior myoma  - Most recent dopplers 3/1 remain elevated  - Serial growth US  - Twice weekly BPP/Dopplers    #Fetal Well Being  - NST bid  - s/p BMZ (-) for fetal lung maturity  - s/p MgSO4 (-) for neuroprotection  - GBS (): positive    - Appreciate NICU consult    #Maternal Wellbeing  - Con't PNVs  - Reg diet  - SLIV  - HSQ + SCDs for DVT ppx     [place] PGY3 38yo  @30w0d transferred from [facility] for admission to the antepartum service for pre-eclampsia with severe features and fetal growth restriction. Blood pressures mild range overnight. Patient currently in stable condition, asymptomatic, maternal + fetal status reassuring.     #PEC w/ severe features  - HELLP labs reviewed       -- Cr uptrendin.44->0.77; con't to trend   - P/C: 14.2  - s/p MgSO4 (-) for seizure ppx  - Con't Procardia 30XL BID + Labetalol 200mg TID        -- s/p Labetalol 20/40/80 prior to transfer       -- s/p Procardia 10/10 IR ()    - Con't to monitor BPs     #Fetal Growth Restriction  - EFW at 2%ile, AC 2%ile  - UAD remain elevated on recent sono (on 3/3)  - Serial growth US  - Twice weekly BPP/Dopplers    #Fetal Wellbeing  - NST bid  - ATU (3/3): vertex, anterior placenta, BRIGIDA 13.87, UAD elev, BPP 10/10       -- Last EFW (): 995g (2%, AC 2%)  - s/p BMZ (-) for fetal lung maturity  - s/p MgSO4 (-) for neuroprotection  - GBS (): positive    - Appreciate NICU consult    #Maternal Wellbeing  - Con't PNVs  - Reg diet  - SLIV  - Maintain active T&S  - HSQ + SCDs for DVT ppx     [place] PGY3

## 2023-03-04 NOTE — CHART NOTE - NSCHARTNOTEFT_GEN_A_CORE
Patient seen and evaluated at the bedside after nurse called to inform that patient had severe range blood pressure to 168/116 on routine vitals. Repeat BP 15 minutes later remained severe range at 180/112. At the bedside patient denies headache, vision changes, chest pain, SOB, upper abdominal/epigastric pain. Endorses bilateral leg swelling, but notes that this has been present x2 weeks. Denies vaginal bleeding, leakage of fluid, contractions. Endorses good fetal movement.     Vital Signs Last 24 Hrs  T(C): 36.6 (04 Mar 2023 21:14), Max: 37.1 (04 Mar 2023 05:38)  T(F): 97.8 (04 Mar 2023 21:14), Max: 98.7 (04 Mar 2023 05:38)  HR: 81 (04 Mar 2023 21:14) (75 - 86)  BP: 168/116 (04 Mar 2023 21:14) (146/97 - 171/98)  BP(mean): --  RR: 19 (04 Mar 2023 21:14) (16 - 19)  SpO2: 99% (04 Mar 2023 21:14) (98% - 100%)    Parameters below as of 04 Mar 2023 21:14  Patient On (Oxygen Delivery Method): room air    Physical exam  Constitutional: NAD  Cardioresp: warm, well-perfused, normal work of breathing  Abd: soft, gravid, nontender  Ext: bilateral 2+ pitting edema to level of knee, no asymmetry, erythema. Dom's sign negative bilaterally    Assessment: Patient seen and evaluated at the bedside after nurse called to inform that patient had severe range blood pressure to 168/116 on routine vitals. Repeat BP 15 minutes later remained severe range at 180/112. At the bedside patient denies headache, vision changes, chest pain, SOB, upper abdominal/epigastric pain. Endorses bilateral leg swelling, but notes that this has been present x2 weeks. Denies vaginal bleeding, leakage of fluid, contractions. Endorses good fetal movement.     Vital Signs Last 24 Hrs  T(C): 36.6 (04 Mar 2023 21:14), Max: 37.1 (04 Mar 2023 05:38)  T(F): 97.8 (04 Mar 2023 21:14), Max: 98.7 (04 Mar 2023 05:38)  HR: 81 (04 Mar 2023 21:14) (75 - 86)  BP: 168/116 (04 Mar 2023 21:14) (146/97 - 171/98)  BP(mean): --  RR: 19 (04 Mar 2023 21:14) (16 - 19)  SpO2: 99% (04 Mar 2023 21:14) (98% - 100%)    Parameters below as of 04 Mar 2023 21:14  Patient On (Oxygen Delivery Method): room air    Physical exam  Constitutional: NAD  Cardioresp: warm, well-perfused, normal work of breathing  Abd: soft, gravid, nontender  Ext: bilateral 2+ pitting edema to level of knee, no asymmetry, erythema. Dom's sign negative bilaterally  TAUS: vtx    Assessment: 36yo  @30wk w/ sPEC and severe IUGR w/ elevated UAD. Patient evaluated for severe range blood pressures. Patient endorses bilateral leg swelling, but denies any additional signs/symptoms of sPEC. Patient transferred to L&D for continuous monitoring and BP stabilization.     Plan  - Patient received Procardia 60mg XL, Labetalol 300mg PO  - Patient received Procardia 10mg IR PO and Labetalol 20mg IVP   - Mg started for sPEC  - Rescue BMZ started  - HELLP labs sent  - Continuous fetal monitoring, FHT reactive, no concerns  - Patient consented for  delivery; If BP remains severe after administration of Labetalol 20mg IVP, patient counseled on recommendation for  delivery   - Anesthesia aware    d/w and evaluatred w/ Dr. Charly Case PGY3 Patient seen and evaluated at the bedside after nurse called to inform that patient had severe range blood pressure to 168/116 on routine vitals. Repeat BP 15 minutes later remained severe range at 180/112. At the bedside patient denies headache, vision changes, chest pain, SOB, upper abdominal/epigastric pain. Endorses bilateral leg swelling, but notes that this has been present x2 weeks. Denies vaginal bleeding, leakage of fluid, contractions. Endorses good fetal movement.     Vital Signs Last 24 Hrs  T(C): 36.6 (04 Mar 2023 21:14), Max: 37.1 (04 Mar 2023 05:38)  T(F): 97.8 (04 Mar 2023 21:14), Max: 98.7 (04 Mar 2023 05:38)  HR: 81 (04 Mar 2023 21:14) (75 - 86)  BP: 168/116 (04 Mar 2023 21:14) (146/97 - 171/98)  BP(mean): --  RR: 19 (04 Mar 2023 21:14) (16 - 19)  SpO2: 99% (04 Mar 2023 21:14) (98% - 100%)    Parameters below as of 04 Mar 2023 21:14  Patient On (Oxygen Delivery Method): room air    Physical exam  Constitutional: NAD  Cardioresp: warm, well-perfused, normal work of breathing  Abd: soft, gravid, nontender  Ext: bilateral 2+ pitting edema to level of knee, no asymmetry, erythema. Dom's sign negative bilaterally  TAUS: vtx    Assessment: 36yo  @30wk w/ sPEC and severe IUGR w/ elevated UAD. Patient evaluated for severe range blood pressures. Patient endorses bilateral leg swelling, but denies any additional signs/symptoms of sPEC. Patient transferred to L&D for continuous monitoring and BP stabilization.     Plan  - Patient received Procardia 60mg XL, Labetalol 300mg PO  - Patient received Procardia 10mg IR PO and Labetalol 20mg IVP   - Mg started for sPEC  - Rescue BMZ started  - HELLP labs sent  - Continuous fetal monitoring, FHT reactive, no concerns  - Patient consented for  delivery; If BP remains severe after administration of Labetalol 20mg IVP, patient counseled on recommendation for  delivery   - Anesthesia aware    d/w and evaluatred w/ Dr. Charly Case PGY3      MFM Fellow/ Addendum  36yo  @30wk w/ sPEC and severe IUGR w/ elevated UAD is now stable from a BP perspective after receiving additional 10mg procardia, 20mg labetalol IVP in addition to her standing meds. Asxs. EFM reassring. on mg gtt. rBMTZ. If another severe range blood pressure will likely proceed with delivery as risk of prematurity outweighed by progressive severe preeclampsia threating maternal/fetal condition.    Gera Parks MD    D/w Dr. Kuo

## 2023-03-04 NOTE — PROGRESS NOTE ADULT - ASSESSMENT
Antihypertensive regimen reviewed and BPs. In the setting of severe range today and need for procardia 10IR, decision made to up titrate antihypertensive regimen.     Plan:  - Nifed 60XL BID   - Labetalol 300 TID     Carly Hirschberg, MFM Fellow  Plan discussed with CAROLYN Nuñez Attending

## 2023-03-04 NOTE — PROVIDER CONTACT NOTE (OTHER) - DATE AND TIME:
04-Mar-2023 21:33
03-Mar-2023 09:05
03-Mar-2023 12:35
03-Mar-2023 12:53
28-Feb-2023 00:30
04-Mar-2023 09:30
04-Mar-2023 21:17
04-Mar-2023 09:48

## 2023-03-04 NOTE — PROVIDER CONTACT NOTE (OTHER) - REASON
/100, pt asymptomatic, Procardia 60mgXL as ordered
/96
Repeat /98, pt remains asymptomatic
Repeat BP
bp 154/97 with procardia due
157/92
Elevated BP
Patient bp 163/100

## 2023-03-05 LAB
ALBUMIN SERPL ELPH-MCNC: 2.7 G/DL — LOW (ref 3.3–5)
ALP SERPL-CCNC: 121 U/L — HIGH (ref 40–120)
ALT FLD-CCNC: 29 U/L — SIGNIFICANT CHANGE UP (ref 10–45)
ANION GAP SERPL CALC-SCNC: 12 MMOL/L — SIGNIFICANT CHANGE UP (ref 5–17)
APTT BLD: 27.2 SEC — LOW (ref 27.5–35.5)
AST SERPL-CCNC: 38 U/L — SIGNIFICANT CHANGE UP (ref 10–40)
BILIRUB SERPL-MCNC: 0.1 MG/DL — LOW (ref 0.2–1.2)
BUN SERPL-MCNC: 22 MG/DL — SIGNIFICANT CHANGE UP (ref 7–23)
CALCIUM SERPL-MCNC: 8.3 MG/DL — LOW (ref 8.4–10.5)
CHLORIDE SERPL-SCNC: 101 MMOL/L — SIGNIFICANT CHANGE UP (ref 96–108)
CO2 SERPL-SCNC: 16 MMOL/L — LOW (ref 22–31)
CREAT SERPL-MCNC: 0.82 MG/DL — SIGNIFICANT CHANGE UP (ref 0.5–1.3)
EGFR: 96 ML/MIN/1.73M2 — SIGNIFICANT CHANGE UP
GLUCOSE SERPL-MCNC: 119 MG/DL — HIGH (ref 70–99)
HCT VFR BLD CALC: 30.1 % — LOW (ref 34.5–45)
HGB BLD-MCNC: 9.7 G/DL — LOW (ref 11.5–15.5)
INR BLD: 0.79 RATIO — LOW (ref 0.88–1.16)
MAGNESIUM SERPL-MCNC: 6.8 MG/DL — HIGH (ref 1.6–2.6)
MAGNESIUM SERPL-MCNC: 7.3 MG/DL — HIGH (ref 1.6–2.6)
MAGNESIUM SERPL-MCNC: 7.5 MG/DL — HIGH (ref 1.6–2.6)
MAGNESIUM SERPL-MCNC: 7.9 MG/DL — HIGH (ref 1.6–2.6)
MCHC RBC-ENTMCNC: 29.8 PG — SIGNIFICANT CHANGE UP (ref 27–34)
MCHC RBC-ENTMCNC: 32.2 GM/DL — SIGNIFICANT CHANGE UP (ref 32–36)
MCV RBC AUTO: 92.6 FL — SIGNIFICANT CHANGE UP (ref 80–100)
NRBC # BLD: 0 /100 WBCS — SIGNIFICANT CHANGE UP (ref 0–0)
PHOSPHATE SERPL-MCNC: 6.1 MG/DL — HIGH (ref 2.5–4.5)
PLATELET # BLD AUTO: 171 K/UL — SIGNIFICANT CHANGE UP (ref 150–400)
POTASSIUM SERPL-MCNC: 5.5 MMOL/L — HIGH (ref 3.5–5.3)
POTASSIUM SERPL-SCNC: 5.5 MMOL/L — HIGH (ref 3.5–5.3)
PROT SERPL-MCNC: 5.6 G/DL — LOW (ref 6–8.3)
PROTHROM AB SERPL-ACNC: 9.2 SEC — LOW (ref 10.5–13.4)
RBC # BLD: 3.25 M/UL — LOW (ref 3.8–5.2)
RBC # FLD: 13.9 % — SIGNIFICANT CHANGE UP (ref 10.3–14.5)
SODIUM SERPL-SCNC: 129 MMOL/L — LOW (ref 135–145)
WBC # BLD: 18.55 K/UL — HIGH (ref 3.8–10.5)
WBC # FLD AUTO: 18.55 K/UL — HIGH (ref 3.8–10.5)

## 2023-03-05 PROCEDURE — 99232 SBSQ HOSP IP/OBS MODERATE 35: CPT

## 2023-03-05 PROCEDURE — 88307 TISSUE EXAM BY PATHOLOGIST: CPT | Mod: 26

## 2023-03-05 RX ORDER — ACETAMINOPHEN 500 MG
975 TABLET ORAL
Refills: 0 | Status: DISCONTINUED | OUTPATIENT
Start: 2023-03-05 | End: 2023-03-09

## 2023-03-05 RX ORDER — OXYCODONE HYDROCHLORIDE 5 MG/1
5 TABLET ORAL
Refills: 0 | Status: DISCONTINUED | OUTPATIENT
Start: 2023-03-05 | End: 2023-03-05

## 2023-03-05 RX ORDER — OXYCODONE HYDROCHLORIDE 5 MG/1
10 TABLET ORAL
Refills: 0 | Status: DISCONTINUED | OUTPATIENT
Start: 2023-03-05 | End: 2023-03-05

## 2023-03-05 RX ORDER — HYDRALAZINE HCL 50 MG
5 TABLET ORAL ONCE
Refills: 0 | Status: COMPLETED | OUTPATIENT
Start: 2023-03-05 | End: 2023-03-05

## 2023-03-05 RX ORDER — OXYTOCIN 10 UNIT/ML
333.33 VIAL (ML) INJECTION
Qty: 20 | Refills: 0 | Status: DISCONTINUED | OUTPATIENT
Start: 2023-03-05 | End: 2023-03-09

## 2023-03-05 RX ORDER — OXYCODONE HYDROCHLORIDE 5 MG/1
5 TABLET ORAL
Refills: 0 | Status: DISCONTINUED | OUTPATIENT
Start: 2023-03-05 | End: 2023-03-09

## 2023-03-05 RX ORDER — POLYETHYLENE GLYCOL 3350 17 G/17G
17 POWDER, FOR SOLUTION ORAL AT BEDTIME
Refills: 0 | Status: DISCONTINUED | OUTPATIENT
Start: 2023-03-05 | End: 2023-03-09

## 2023-03-05 RX ORDER — NALOXONE HYDROCHLORIDE 4 MG/.1ML
0.1 SPRAY NASAL
Refills: 0 | Status: DISCONTINUED | OUTPATIENT
Start: 2023-03-05 | End: 2023-03-05

## 2023-03-05 RX ORDER — LABETALOL HCL 100 MG
80 TABLET ORAL ONCE
Refills: 0 | Status: COMPLETED | OUTPATIENT
Start: 2023-03-05 | End: 2023-03-05

## 2023-03-05 RX ORDER — MAGNESIUM SULFATE 500 MG/ML
2 VIAL (ML) INJECTION
Qty: 40 | Refills: 0 | Status: DISCONTINUED | OUTPATIENT
Start: 2023-03-05 | End: 2023-03-06

## 2023-03-05 RX ORDER — SIMETHICONE 80 MG/1
80 TABLET, CHEWABLE ORAL EVERY 4 HOURS
Refills: 0 | Status: DISCONTINUED | OUTPATIENT
Start: 2023-03-05 | End: 2023-03-09

## 2023-03-05 RX ORDER — DIPHENHYDRAMINE HCL 50 MG
25 CAPSULE ORAL EVERY 6 HOURS
Refills: 0 | Status: DISCONTINUED | OUTPATIENT
Start: 2023-03-05 | End: 2023-03-09

## 2023-03-05 RX ORDER — NALBUPHINE HYDROCHLORIDE 10 MG/ML
2.5 INJECTION, SOLUTION INTRAMUSCULAR; INTRAVENOUS; SUBCUTANEOUS EVERY 6 HOURS
Refills: 0 | Status: DISCONTINUED | OUTPATIENT
Start: 2023-03-05 | End: 2023-03-05

## 2023-03-05 RX ORDER — DEXAMETHASONE 0.5 MG/5ML
4 ELIXIR ORAL EVERY 6 HOURS
Refills: 0 | Status: DISCONTINUED | OUTPATIENT
Start: 2023-03-05 | End: 2023-03-09

## 2023-03-05 RX ORDER — LABETALOL HCL 100 MG
40 TABLET ORAL ONCE
Refills: 0 | Status: COMPLETED | OUTPATIENT
Start: 2023-03-05 | End: 2023-03-05

## 2023-03-05 RX ORDER — MAGNESIUM HYDROXIDE 400 MG/1
30 TABLET, CHEWABLE ORAL
Refills: 0 | Status: DISCONTINUED | OUTPATIENT
Start: 2023-03-05 | End: 2023-03-09

## 2023-03-05 RX ORDER — IBUPROFEN 200 MG
600 TABLET ORAL EVERY 6 HOURS
Refills: 0 | Status: COMPLETED | OUTPATIENT
Start: 2023-03-05 | End: 2024-02-01

## 2023-03-05 RX ORDER — MORPHINE SULFATE 50 MG/1
0.1 CAPSULE, EXTENDED RELEASE ORAL ONCE
Refills: 0 | Status: DISCONTINUED | OUTPATIENT
Start: 2023-03-05 | End: 2023-03-05

## 2023-03-05 RX ORDER — ENOXAPARIN SODIUM 100 MG/ML
40 INJECTION SUBCUTANEOUS EVERY 24 HOURS
Refills: 0 | Status: DISCONTINUED | OUTPATIENT
Start: 2023-03-05 | End: 2023-03-06

## 2023-03-05 RX ORDER — SODIUM CHLORIDE 9 MG/ML
1000 INJECTION, SOLUTION INTRAVENOUS
Refills: 0 | Status: DISCONTINUED | OUTPATIENT
Start: 2023-03-05 | End: 2023-03-09

## 2023-03-05 RX ORDER — LANOLIN
1 OINTMENT (GRAM) TOPICAL EVERY 6 HOURS
Refills: 0 | Status: DISCONTINUED | OUTPATIENT
Start: 2023-03-05 | End: 2023-03-09

## 2023-03-05 RX ORDER — SODIUM CHLORIDE 9 MG/ML
1000 INJECTION, SOLUTION INTRAVENOUS
Refills: 0 | Status: DISCONTINUED | OUTPATIENT
Start: 2023-03-05 | End: 2023-03-05

## 2023-03-05 RX ORDER — ONDANSETRON 8 MG/1
4 TABLET, FILM COATED ORAL EVERY 6 HOURS
Refills: 0 | Status: DISCONTINUED | OUTPATIENT
Start: 2023-03-05 | End: 2023-03-09

## 2023-03-05 RX ORDER — KETOROLAC TROMETHAMINE 30 MG/ML
30 SYRINGE (ML) INJECTION EVERY 6 HOURS
Refills: 0 | Status: DISCONTINUED | OUTPATIENT
Start: 2023-03-05 | End: 2023-03-06

## 2023-03-05 RX ORDER — SENNA PLUS 8.6 MG/1
2 TABLET ORAL AT BEDTIME
Refills: 0 | Status: DISCONTINUED | OUTPATIENT
Start: 2023-03-05 | End: 2023-03-09

## 2023-03-05 RX ORDER — TETANUS TOXOID, REDUCED DIPHTHERIA TOXOID AND ACELLULAR PERTUSSIS VACCINE, ADSORBED 5; 2.5; 8; 8; 2.5 [IU]/.5ML; [IU]/.5ML; UG/.5ML; UG/.5ML; UG/.5ML
0.5 SUSPENSION INTRAMUSCULAR ONCE
Refills: 0 | Status: DISCONTINUED | OUTPATIENT
Start: 2023-03-05 | End: 2023-03-09

## 2023-03-05 RX ADMIN — Medication 80 MILLIGRAM(S): at 03:04

## 2023-03-05 RX ADMIN — Medication 50 GM/HR: at 14:33

## 2023-03-05 RX ADMIN — POLYETHYLENE GLYCOL 3350 17 GRAM(S): 17 POWDER, FOR SOLUTION ORAL at 21:58

## 2023-03-05 RX ADMIN — Medication 300 MILLIGRAM(S): at 06:14

## 2023-03-05 RX ADMIN — SENNA PLUS 2 TABLET(S): 8.6 TABLET ORAL at 21:58

## 2023-03-05 RX ADMIN — Medication 40 MILLIGRAM(S): at 02:18

## 2023-03-05 RX ADMIN — Medication 30 MILLIGRAM(S): at 23:42

## 2023-03-05 RX ADMIN — Medication 60 MILLIGRAM(S): at 09:14

## 2023-03-05 RX ADMIN — Medication 1 TABLET(S): at 14:31

## 2023-03-05 RX ADMIN — Medication 300 MILLIGRAM(S): at 13:38

## 2023-03-05 RX ADMIN — Medication 1000 MILLIUNIT(S)/MIN: at 14:33

## 2023-03-05 RX ADMIN — ENOXAPARIN SODIUM 40 MILLIGRAM(S): 100 INJECTION SUBCUTANEOUS at 17:55

## 2023-03-05 RX ADMIN — Medication 300 MILLIGRAM(S): at 21:53

## 2023-03-05 RX ADMIN — Medication 60 MILLIGRAM(S): at 20:49

## 2023-03-05 RX ADMIN — Medication 5 MILLIGRAM(S): at 06:12

## 2023-03-05 NOTE — CONSULT NOTE ADULT - ASSESSMENT
ASSESSMENT: 37yFemale     PLAN:   Neurologic:  - Oxy prn for pain  - S/p epidural for C section  - Magnesium gtt x24h     Respiratory:  - COVID+ but asymptomatic    Cardiovascular:  - HTNsive prior to arrival, s/p labetalol IV 20 + 40 +80, oral labetalol 100mg, nifedipine 10mg oral, hydral 5mg IV (in addition to below oral medications)  -     Gastrointestinal/Nutrition:    Genitourinary/Renal:    Hematologic:    Infectious Disease:    Endocrine:    Disposition: 37yFemale with preeclampsia s/p C/S 3/5 admitted to SICU post op for hypertension management.    PLAN:   Neurologic:  - Oxy prn for pain  - S/p epidural for C section  - Magnesium gtt x24h     Respiratory:  - COVID+ but asymptomatic    Cardiovascular:  - HTNsive prior to arrival, s/p labetalol IV 20 + 40 +80, oral labetalol 100mg, nifedipine 10mg oral, hydral 5mg IV (in addition to below oral medications)  - Labetalol 300mg Q8h, nifedipine 60mg BID    Gastrointestinal/Nutrition:  - Regular diet  - Bowel regimen while getting opioids    Genitourinary/Renal:  - Bill[place] in place  - Mag gtt until 5am tomorrow    Hematologic:  - Monitor H/h  - Defer to OB when to start chemical VTE ppx    Infectious Disease:  - COVID+ but asymptomatic: isolation precautions  - WBC 11 -> 18.55, likely reactive    Endocrine:  - Monitor glucose on BMP    Disposition: SICU    [id number]

## 2023-03-05 NOTE — PATIENT PROFILE ADULT - FALL HARM RISK - HARM RISK INTERVENTIONS
Assistance with ambulation/Assistance OOB with selected safe patient handling equipment/Communicate Risk of Fall with Harm to all staff/Discuss with provider need for PT consult/Monitor gait and stability/Provide patient with walking aids - walker, cane, crutches/Reinforce activity limits and safety measures with patient and family/Sit up slowly, dangle for a short time, stand at bedside before walking/Tailored Fall Risk Interventions/Use of alarms - bed, chair and/or voice tab/Visual Cue: Yellow wristband and red socks/Bed in lowest position, wheels locked, appropriate side rails in place/Call bell, personal items and telephone in reach/Instruct patient to call for assistance before getting out of bed or chair/Non-slip footwear when patient is out of bed/[place] to call system/Physically safe environment - no spills, clutter or unnecessary equipment/Purposeful Proactive Rounding/Room/bathroom lighting operational, light cord in reach

## 2023-03-05 NOTE — PATIENT PROFILE ADULT - NSPROPATIENTLACTATING_GEN_A_NUR
O-Z Plasty Text: The defect edges were debeveled with a #15 scalpel blade.  Given the location of the defect, shape of the defect and the proximity to free margins an O-Z plasty (double transposition flap) was deemed most appropriate.  Using a sterile surgical marker, the appropriate transposition flaps were drawn incorporating the defect and placing the expected incisions within the relaxed skin tension lines where possible.    The area thus outlined was incised deep to adipose tissue with a #15 scalpel blade.  The skin margins were undermined to an appropriate distance in all directions utilizing iris scissors.  Hemostasis was achieved with electrocautery.  The flaps were then transposed into place, one clockwise and the other counterclockwise, and anchored with interrupted buried subcutaneous sutures. unknown

## 2023-03-05 NOTE — OB PROVIDER DELIVERY SUMMARY - NSPROVIDERDELIVERYNOTE_OBGYN_ALL_OB_FT
pLTCS performed at 30w1d for sPEC refractory to increasing doses of maintenance antihypertensive medications, and multiple doses of immediate release antihypertensive medications  delivery of viable infant, cephalic presentation, weight ______, APGARS ________  grossly normal fibroid uterus, b/l tubes and ovaries  hysterotomy closed in 1 layer with Vicryl suture    800/1400/200    Dictation #: pLTCS performed at 30w1d for sPEC refractory to increasing doses of maintenance antihypertensive medications, and multiple doses of immediate release antihypertensive medications  delivery of viable infant, cephalic presentation, weight 1090g, APGARS 8/8  grossly normal fibroid uterus, b/l tubes and ovaries  hysterotomy closed in 1 layer with Vicryl suture    800/1400/200    Dictation #: pLTCS performed at 30w1d for sPEC refractory to increasing doses of maintenance antihypertensive medications, and multiple doses of immediate release antihypertensive medications  delivery of viable infant, cephalic presentation, weight 1090g, APGARS 8/8  large volume clear fluid noted upon intraperitoneal entry into abdominal cavity  grossly normal fibroid uterus, b/l tubes and ovaries  hysterotomy closed in 1 layer with Vicryl suture    800/1400/200    Dictation #: [id number]

## 2023-03-05 NOTE — OB RN DELIVERY SUMMARY - NSSELHIDDEN_OBGYN_ALL_OB_FT
[[email or web address]_OB_FT:[id number]],[NS_[email or web address]_ALL_OB_FT:[id number]],[[email or web address]_OBGYN_ALL_OB_FT:[id number]]

## 2023-03-05 NOTE — OB NEONATOLOGY/PEDIATRICIAN DELIVERY SUMMARY - NS_FINALEDD_OBGYN_ALL_OB_DT
09/05/19 0434   Pain 1   Pain Scale 1 Numeric (0 - 10)   Pain Intensity 1 9   Patient Stated Pain Goal 0   Pain Onset 1 acute   Pain Location 1 Chest;Back;Rib cage   Pain Orientation 1 Posterior;Mid;Right;Left   Pain Description 1 Aching   Pain Intervention(s) 1 Medication (see MAR)   Patient received morphine 2 mg via IV per request 13-May-2023

## 2023-03-05 NOTE — OB PROVIDER DELIVERY SUMMARY - NSSELHIDDEN_OBGYN_ALL_OB_FT
[NS_DeliveryAttending1_OBGYN_ALL_OB_FT:[id number]],[NS_DeliveryAssist1_OBGYN_ALL_OB_FT:[id number]=]

## 2023-03-05 NOTE — CONSULT NOTE ADULT - SUBJECTIVE AND OBJECTIVE BOX
HISTORY OF PRESENT ILLNESS:  HPI:  R3 Admission Note    38yo  at 28w6d GA transferred from [facility] for management of severe preeclampsia. Patient presented to [facility] with elevated blood pressures requiring Labetalol IVP 20/40/80mg and was started on Procardia 30XL. MgSO4 gtt started for seizure ppx. Scan at [facility] demonstrated breech presentation with severe IUGR (945g <1%ile) with elevated dopplers, intermittent AEDV. Patient was transferred to [facility] for further management.     Patient seen and evaluated on arrival. Denies HA, visual changes, RUQ/epigastric pain, CP, SOB.  Pt reports +FM, denies LOF, VB, ctx.     POB: none  PGYN: -cysts, denied STD hx, denies abnormal PAPs  PMH: none  PSH: none   SH: Denies tobacco use, EtOH use and illicit drug use during the pregnancy; Feels safe at home  Meds: PNV  All: NKDA             (2023 14:38)      PAST MEDICAL HISTORY: No pertinent past medical history        PAST SURGICAL HISTORY:     FAMILY HISTORY:     SOCIAL HISTORY:    CODE STATUS:     HOME MEDICATIONS:    ALLERGIES: No Known Allergies      VITAL SIGNS:  ICU Vital Signs Last 24 Hrs  T(C): 37.2 (05 Mar 2023 09:30), Max: 37.3 (05 Mar 2023 06:45)  T(F): 99 (05 Mar 2023 09:30), Max: 99.1 (05 Mar 2023 06:45)  HR: 73 (05 Mar 2023 10:00) (58 - 90)  BP: 159/81 (05 Mar 2023 10:00) (125/97 - 191/104)  BP(mean): 112 (05 Mar 2023 10:00) (112 - 122)  ABP: --  ABP(mean): --  RR: 23 (05 Mar 2023 10:00) (16 - 23)  SpO2: 96% (05 Mar 2023 10:00) (90% - 100%)    O2 Parameters below as of 05 Mar 2023 09:30  Patient On (Oxygen Delivery Method): room air            PHYSICAL EXAM:  Gen: NAD  Neuro: A&Ox3  Resp: no increased WOB, satting well on RA  Cardiac: appears well perfused, extremities warm  Abd: soft, nondistended  : Billings draining clear yellow urine    NEURO  Meds:metoclopramide Injectable 10 milliGRAM(s) IV Push once  morphine PF Spinal 0.1 milliGRAM(s) IntraThecal. once  nalbuphine Injectable 2.5 milliGRAM(s) IV Push every 6 hours PRN Pruritus  ondansetron Injectable 4 milliGRAM(s) IV Push every 6 hours PRN Nausea  oxyCODONE    IR 5 milliGRAM(s) Oral every 3 hours PRN Mild Pain (1 - 3)  oxyCODONE    IR 10 milliGRAM(s) Oral every 3 hours PRN Moderate Pain (4 - 6)      RESPIRATORY  Mechanical Ventilation:     Meds:    CARDIOVASCULAR    Cardiac Rhythm:  Meds:labetalol 300 milliGRAM(s) Oral every 8 hours  NIFEdipine XL 60 milliGRAM(s) Oral every 12 hours      GI/NUTRITION  Diet:  Meds:    GENITOURINARY/RENAL  Meds:lactated ringers. 1000 milliLiter(s) IV Continuous <Continuous>  oxytocin Infusion 333.333 milliUNIT(s)/Min IV Continuous <Continuous>  prenatal multivitamin 1 Tablet(s) Oral daily       @ 07:  -   @ 07:00  --------------------------------------------------------  IN:    IV PiggyBack: 100 mL    Lactated Ringers: 1000 mL    Magnesium Sulfate: 337.5 mL    Oxytocin: 650 mL  Total IN: 2087.5 mL    OUT:    Blood Loss (mL): 800 mL    Indwelling Catheter - Urethral (mL): 400 mL    Voided (mL): 1100 mL  Total OUT: 2300 mL    Total NET: -212.5 mL       @ 07:01  -   @ 11:13  --------------------------------------------------------  IN:    Magnesium Sulfate: 100 mL    Oxytocin: 350 mL  Total IN: 450 mL    OUT:    Indwelling Catheter - Urethral (mL): 290 mL  Total OUT: 290 mL    Total NET: 160 mL        Weight (kg): 67.1 ( @ 03:01)  03-05    129<L>  |  101  |  22  ----------------------------<  119<H>  5.5<H>   |  16<L>  |  0.82    Ca    8.3<L>      05 Mar 2023 10:01  Phos  6.1     03-05  Mg     7.9     03-05    TPro  5.6<L>  /  Alb  2.7<L>  /  TBili  0.1<L>  /  DBili  x   /  AST  38  /  ALT  29  /  AlkPhos  121<H>  03-05    [ ] Billings catheter, indication: urine output monitoring in critically ill patient    HEMATOLOGIC  [ ] VTE Prophylaxis:                          9.7    18.55 )-----------( 171      ( 05 Mar 2023 10:01 )             30.1     PT/INR - ( 05 Mar 2023 10:01 )   PT: 9.2 sec;   INR: 0.79 ratio         PTT - ( 05 Mar 2023 10:01 )  PTT:27.2 sec  Transfusion: [ ] PRBC	[ ] Platelets	[ ] FFP	[ ] Cryoprecipitate      INFECTIOUS DISEASES  Meds:  RECENT CULTURES:      ENDOCRINE  Meds:betamethasone Injectable 12 milliGRAM(s) IntraMuscular every 24 hours  dexAMETHasone  Injectable 4 milliGRAM(s) IV Push every 6 hours PRN    CAPILLARY BLOOD GLUCOSE          PATIENT CARE ACCESS DEVICES:  [ ] Peripheral IV  [ ] Central Venous Line	[ ] R	[ ] L	[ ] IJ	[ ] Fem	[ ] SC	Placed:   [ ] Arterial Line		[ ] R	[ ] L	[ ] Fem	[ ] Rad	[ ] Ax	Placed:   [ ] PICC:					[ ] Mediport  [ ] Urinary Catheter, Date Placed:   [x] Necessity of urinary, arterial, and venous catheters discussed    OTHER MEDICATIONS: naloxone Injectable 0.1 milliGRAM(s) IV Push every 3 minutes PRN      IMAGING STUDIES: HISTORY OF PRESENT ILLNESS:    38yo  at 28w6d GA transferred from [facility] for management of severe preeclampsia.  Pt is now s/p C/section at 30w, with postop hypertension refractory to treatment.    Pt is on standing labetalol PO 300mg Q8h, nifedipine 60mg PO Q12h. In addition also got labetalol IV 20 + 40 +80, oral labetalol 100mg, nifedipine 10mg oral, hydral 5mg IV however refractory to these meds.    SICU consulted for preeclampsia monitoring and hypertension control.            PAST MEDICAL HISTORY: No pertinent past medical history        PAST SURGICAL HISTORY:     FAMILY HISTORY:     SOCIAL HISTORY:    CODE STATUS:     HOME MEDICATIONS:    ALLERGIES: No Known Allergies      VITAL SIGNS:  ICU Vital Signs Last 24 Hrs  T(C): 37.2 (05 Mar 2023 09:30), Max: 37.3 (05 Mar 2023 06:45)  T(F): 99 (05 Mar 2023 09:30), Max: 99.1 (05 Mar 2023 06:45)  HR: 73 (05 Mar 2023 10:00) (58 - 90)  BP: 159/81 (05 Mar 2023 10:00) (125/97 - 191/104)  BP(mean): 112 (05 Mar 2023 10:00) (112 - 122)  ABP: --  ABP(mean): --  RR: 23 (05 Mar 2023 10:00) (16 - 23)  SpO2: 96% (05 Mar 2023 10:00) (90% - 100%)    O2 Parameters below as of 05 Mar 2023 09:30  Patient On (Oxygen Delivery Method): room air            PHYSICAL EXAM:  Gen: NAD  Neuro: A&Ox3  Resp: no increased WOB, satting well on RA  Cardiac: appears well perfused, extremities warm  Abd: soft, nondistended  : Billings draining clear yellow urine    NEURO  Meds:metoclopramide Injectable 10 milliGRAM(s) IV Push once  morphine PF Spinal 0.1 milliGRAM(s) IntraThecal. once  nalbuphine Injectable 2.5 milliGRAM(s) IV Push every 6 hours PRN Pruritus  ondansetron Injectable 4 milliGRAM(s) IV Push every 6 hours PRN Nausea  oxyCODONE    IR 5 milliGRAM(s) Oral every 3 hours PRN Mild Pain (1 - 3)  oxyCODONE    IR 10 milliGRAM(s) Oral every 3 hours PRN Moderate Pain (4 - 6)      RESPIRATORY  Mechanical Ventilation:     Meds:    CARDIOVASCULAR    Cardiac Rhythm:  Meds:labetalol 300 milliGRAM(s) Oral every 8 hours  NIFEdipine XL 60 milliGRAM(s) Oral every 12 hours      GI/NUTRITION  Diet:  Meds:    GENITOURINARY/RENAL  Meds:lactated ringers. 1000 milliLiter(s) IV Continuous <Continuous>  oxytocin Infusion 333.333 milliUNIT(s)/Min IV Continuous <Continuous>  prenatal multivitamin 1 Tablet(s) Oral daily       @ 07:  -   @ 07:00  --------------------------------------------------------  IN:    IV PiggyBack: 100 mL    Lactated Ringers: 1000 mL    Magnesium Sulfate: 337.5 mL    Oxytocin: 650 mL  Total IN: 2087.5 mL    OUT:    Blood Loss (mL): 800 mL    Indwelling Catheter - Urethral (mL): 400 mL    Voided (mL): 1100 mL  Total OUT: 2300 mL    Total NET: -212.5 mL       @ 07:01  -   @ 11:13  --------------------------------------------------------  IN:    Magnesium Sulfate: 100 mL    Oxytocin: 350 mL  Total IN: 450 mL    OUT:    Indwelling Catheter - Urethral (mL): 290 mL  Total OUT: 290 mL    Total NET: 160 mL        Weight (kg): 67.1 (05 @ 03:01)  -    129<L>  |  101  |  22  ----------------------------<  119<H>  5.5<H>   |  16<L>  |  0.82    Ca    8.3<L>      05 Mar 2023 10:01  Phos  6.1     03-05  Mg     7.9     -05    TPro  5.6<L>  /  Alb  2.7<L>  /  TBili  0.1<L>  /  DBili  x   /  AST  38  /  ALT  29  /  AlkPhos  121<H>  -    [ ] Billings catheter, indication: urine output monitoring in critically ill patient    HEMATOLOGIC  [ ] VTE Prophylaxis:                          9.7    18.55 )-----------( 171      ( 05 Mar 2023 10:01 )             30.1     PT/INR - ( 05 Mar 2023 10:01 )   PT: 9.2 sec;   INR: 0.79 ratio         PTT - ( 05 Mar 2023 10:01 )  PTT:27.2 sec  Transfusion: [ ] PRBC	[ ] Platelets	[ ] FFP	[ ] Cryoprecipitate      INFECTIOUS DISEASES  Meds:  RECENT CULTURES:      ENDOCRINE  Meds:betamethasone Injectable 12 milliGRAM(s) IntraMuscular every 24 hours  dexAMETHasone  Injectable 4 milliGRAM(s) IV Push every 6 hours PRN    CAPILLARY BLOOD GLUCOSE          PATIENT CARE ACCESS DEVICES:  [ ] Peripheral IV  [ ] Central Venous Line	[ ] R	[ ] L	[ ] IJ	[ ] Fem	[ ] SC	Placed:   [ ] Arterial Line		[ ] R	[ ] L	[ ] Fem	[ ] Rad	[ ] Ax	Placed:   [ ] PICC:					[ ] Mediport  [ ] Urinary Catheter, Date Placed:   [x] Necessity of urinary, arterial, and venous catheters discussed    OTHER MEDICATIONS: naloxone Injectable 0.1 milliGRAM(s) IV Push every 3 minutes PRN      IMAGING STUDIES: HISTORY OF PRESENT ILLNESS:    36yo  at 28w6d GA transferred from [facility] for management of severe preeclampsia.  Pt is now s/p C/section at 30w, with postop hypertension refractory to treatment.    Pt is on standing labetalol PO 300mg Q8h, nifedipine 60mg PO Q12h. In addition also got labetalol IV 20 + 40 +80, oral labetalol 100mg, nifedipine 10mg oral, hydral 5mg IV however refractory to these meds.    SICU consulted for preeclampsia monitoring and hypertension control.            PAST MEDICAL HISTORY: No pertinent past medical history        PAST SURGICAL HISTORY:     FAMILY HISTORY:     SOCIAL HISTORY:    CODE STATUS:     HOME MEDICATIONS:    ALLERGIES: No Known Allergies      VITAL SIGNS:  ICU Vital Signs Last 24 Hrs  T(C): 37.2 (05 Mar 2023 09:30), Max: 37.3 (05 Mar 2023 06:45)  T(F): 99 (05 Mar 2023 09:30), Max: 99.1 (05 Mar 2023 06:45)  HR: 73 (05 Mar 2023 10:00) (58 - 90)  BP: 159/81 (05 Mar 2023 10:00) (125/97 - 191/104)  BP(mean): 112 (05 Mar 2023 10:00) (112 - 122)  ABP: --  ABP(mean): --  RR: 23 (05 Mar 2023 10:00) (16 - 23)  SpO2: 96% (05 Mar 2023 10:00) (90% - 100%)    O2 Parameters below as of 05 Mar 2023 09:30  Patient On (Oxygen Delivery Method): room air            PHYSICAL EXAM:  Gen: NAD  Neuro: A&Ox3  Resp: no increased WOB, satting well on RA  Cardiac: appears well perfused, extremities warm  Abd: soft, nondistended      NEURO  Meds:metoclopramide Injectable 10 milliGRAM(s) IV Push once  morphine PF Spinal 0.1 milliGRAM(s) IntraThecal. once  nalbuphine Injectable 2.5 milliGRAM(s) IV Push every 6 hours PRN Pruritus  ondansetron Injectable 4 milliGRAM(s) IV Push every 6 hours PRN Nausea  oxyCODONE    IR 5 milliGRAM(s) Oral every 3 hours PRN Mild Pain (1 - 3)  oxyCODONE    IR 10 milliGRAM(s) Oral every 3 hours PRN Moderate Pain (4 - 6)      RESPIRATORY  Mechanical Ventilation:     Meds:    CARDIOVASCULAR    Cardiac Rhythm:  Meds:labetalol 300 milliGRAM(s) Oral every 8 hours  NIFEdipine XL 60 milliGRAM(s) Oral every 12 hours      GI/NUTRITION  Diet:  Meds:    GENITOURINARY/RENAL  Meds:lactated ringers. 1000 milliLiter(s) IV Continuous <Continuous>  oxytocin Infusion 333.333 milliUNIT(s)/Min IV Continuous <Continuous>  prenatal multivitamin 1 Tablet(s) Oral daily       @ 07:  -   @ 07:00  --------------------------------------------------------  IN:    IV PiggyBack: 100 mL    Lactated Ringers: 1000 mL    Magnesium Sulfate: 337.5 mL    Oxytocin: 650 mL  Total IN: 2087.5 mL    OUT:    Blood Loss (mL): 800 mL    Indwelling Catheter - Urethral (mL): 400 mL    Voided (mL): 1100 mL  Total OUT: 2300 mL    Total NET: -212.5 mL       @ 07:  -   @ 11:13  --------------------------------------------------------  IN:    Magnesium Sulfate: 100 mL    Oxytocin: 350 mL  Total IN: 450 mL    OUT:    Indwelling Catheter - Urethral (mL): 290 mL  Total OUT: 290 mL    Total NET: 160 mL        Weight (kg): 67.1 ( @ 03:01)  03-05    129<L>  |  101  |  22  ----------------------------<  119<H>  5.5<H>   |  16<L>  |  0.82    Ca    8.3<L>      05 Mar 2023 10:01  Phos  6.1     03-05  Mg     7.9         TPro  5.6<L>  /  Alb  2.7<L>  /  TBili  0.1<L>  /  DBili  x   /  AST  38  /  ALT  29  /  AlkPhos  121<H>  -    [X ] Billings catheter, indication: urine output monitoring in critically ill patient    HEMATOLOGIC  [ ] VTE Prophylaxis:                          9.7    18.55 )-----------( 171      ( 05 Mar 2023 10:01 )             30.1     PT/INR - ( 05 Mar 2023 10:01 )   PT: 9.2 sec;   INR: 0.79 ratio         PTT - ( 05 Mar 2023 10:01 )  PTT:27.2 sec  Transfusion: [ ] PRBC	[ ] Platelets	[ ] FFP	[ ] Cryoprecipitate      INFECTIOUS DISEASES  Meds:  RECENT CULTURES:      ENDOCRINE  Meds:betamethasone Injectable 12 milliGRAM(s) IntraMuscular every 24 hours  dexAMETHasone  Injectable 4 milliGRAM(s) IV Push every 6 hours PRN    CAPILLARY BLOOD GLUCOSE          PATIENT CARE ACCESS DEVICES:  [X ] Peripheral IV  [ ] Central Venous Line	[ ] R	[ ] L	[ ] IJ	[ ] Fem	[ ] SC	Placed:   [ ] Arterial Line		[ ] R	[ ] L	[ ] Fem	[ ] Rad	[ ] Ax	Placed:   [ ] PICC:					[ ] Mediport  [X ] Urinary Catheter, Date Placed:   [x] Necessity of urinary, arterial, and venous catheters discussed    OTHER MEDICATIONS: naloxone Injectable 0.1 milliGRAM(s) IV Push every 3 minutes PRN      IMAGING STUDIES:

## 2023-03-05 NOTE — OB RN INTRAOPERATIVE NOTE - NSSELHIDDEN_OBGYN_ALL_OB_FT
[NS_DeliveryAttending1_OBGYN_ALL_OB_FT:[id number]],[NS_DeliveryAssist1_OBGYN_ALL_OB_FT:[id number]],[NS_DeliveryRN_OBGYN_ALL_OB_FT:[id number]]

## 2023-03-05 NOTE — PATIENT PROFILE ADULT - ARRIVAL FROM
Cardioversion  Cardioversion is a procedure to restore your heart’s normal rhythm from a fast or irregular rhythm (arrhythmia) in the top or bottom chambers of your heart. You may have the procedure in a hospital or surgery center. It’s often done on an outpatient (same day) basis. During the procedure, your doctor will give you medication to minimize discomfort. Then the doctor gives you a brief electric shock. This helps your heartbeat become normal again. In most cases, you can go home within hours of the procedure.     During cardioversion, you are fully sedated and won’t feel a thing.     Before Your Procedure  · Tell your doctor what over-the-counter and prescription medications, herbs, and supplements you are taking.  · Take medication as directed. Your doctor may prescribe anticoagulants (blood thinners), depending on your situation. They help prevent blood clots from forming. Your doctor may order an ultrasound called transesophageal echocardiography. This will help your doctor figure out if you need a blood thinner.   · Ask your doctor about the risks and benefits of cardioversion.  · Sign your consent form.  · Don’t eat or drink anything for 8 to 12 hours before your procedure.  · Follow any other instructions your doctor gives you.   · Arrange for an adult to drive you home after the procedure.  During Your Procedure  · Your health care provider will place small pads (electrodes) on your chest to record your heartbeat at all times.  · Your health care provider will place an intravenous (IV) line in your arm. This gives you medication (sedation) that keeps you free of pain. You’ll feel sleepy.  · Your health care provider will give you oxygen through a soft, plastic tube in your nose.  · Pads will be placed on your chest and back. Your health care provider will give you a very brief electric shock through the pads. Remember, because of sedation, you won’t feel the shock.  · Your doctor will watch your  heartbeat to make sure your normal rhythm has been restored.   After Your Procedure  · Your health care provider will monitor you until you are fully awake. Then you’ll be able to sit up, walk, and eat.  · In most cases, you’ll be able to go home after the sedation wears off. This usually takes a few hours.  · For a few days, the skin on your chest may feel a little sore, like a mild sunburn.  · Don’t drive or operate heavy machinery for 24 hours after the procedure.  · The day after your procedure, try to take it easy. Take medication as directed.  · Call your doctor if you notice skipped beats, a rapid heartbeat, or chest tightness. These may be signs that an irregular heartbeat has returned.  © 3502-6657 [employer]. [address] [place] [address], [place] [address]. Todos los derechos reservados. Esta información no pretende sustituir la atención médica profesional. Sólo mazariegos médico puede diagnosticar y tratar un problema de tyler.         In house

## 2023-03-05 NOTE — PROGRESS NOTE ADULT - TIME BILLING
MFM Attending    I saw and evaluated Ms. Zaragoza at the bedside this morning with Dr. Lawrence, PGY-3. Overnight, the patient's blood pressure continued to rise, requiring several additional acute doses of antihypertensives. In context of worsening preeclampsia, delivery was indicated. The baby was delivered early this morning. Because the patient was essentially at maximum dosages of procardia and labetalol, and required hydralazine, SICU consultation was requested and the patient was transferred to the SICU. Laboratory studies are in an acceptable range. We will follow closely, along with the SICU team, whose consultation and care is much appreciated.
MFM Attending    I saw and evaluated Ms. Zaragoza at the bedside this morning. I discussed her status and plan of care via a  on the language line. I agree with the assessment and plan as outlined by the resident in the note above. For BPP and Doppler studies today. Continue close maternal and fetal follow up.
29 weeks gestation, preeclampsia with severe features, IUGR
29 weeks, preeclampsia with severe features, IUGR
MFM Attending    I saw and evaluated Ms. Zaragoza at the bedside this morning. She is without complaints. Her blood pressures are trending up. To raise procardia XL to 60 mg twice a day. Continue close maternal and fetal surveillance.
MFM Attending    I saw and evaluated Ms. Moore at the bedside. I have read and agree with the assessment and plan as outlined in the note above by Dr. Durand. The patient is asymptomatic. Her blood pressures are increasing somewhat over the last few days. Although delivery is not indicated at this time, consider a rescue course of corticosteroids if the blood pressures continue to rise. Goal blood pressures should be in a range of 130-140's/80's-90. Labatelol was increased to 300 mg every 8 hours this morning.     NST reactive today.     Continue close maternal and fetal surveillance.
MFM Attending    I saw and evaluated Ms. Zaragoza at the bedside this morning. I agree with the assessment and plan as outlined in the note above by Dr. Hirschberg. At this time the risks of delivery exceed the benefits. Continue close maternal and fetal surveillance.
MFM Attending    I saw and evaluated Ms. Zaragoza at the bedside this morning. There is no apparent change in maternal or fetal condition NST is reactive today. Nifedipine has been increased to 60XL/30XL. Continue close maternal and fetal surveillance.
MFM Attending    I saw and evaluated Ms. Zaragoza at the bedside this morning. She now has been diagnosed with COVID-19. She is asymptomatic. Blood pressures have generally improved, but need to be closely watched. BPP-10/10. On Doppler of the umbilical artery, the S/D ratio is elevated, but diastolic velocity is present in all waveforms. At this time, the benefit of continuing the pregnancy exceeds the benefit of delivery at this gestational age, with potentially significant risk to the fetus. Continue close maternal and fetal surveillance.

## 2023-03-05 NOTE — OB NEONATOLOGY/PEDIATRICIAN DELIVERY SUMMARY - NSPEDSNEONOTESA_OBGYN_ALL_OB_FT
Requested by OB to attend this vaginal delivery/ delivery at - weeks for - . Mother is a - year old, GP , blood type    pos/neg.  Prenatal labs as follow: HIV neg, RPR non-reactive, rubella immune, HBsA neg, GBS pos/neg/unk on -.  No significant maternal history. No significant prenatal history. Maternal history significant for -. Prenatal history significant for -. This prenancy was complicated by -. ROM at - with clear/mec/bloody fluid - hours prior to delivery.  Infant emerged vertex/breech, vigorous, with good tone. Delayed cord clamping X   secs, then brought to warmer. Dried, suctioned and stimulated . Apgars  /. Mom wishes to breast/bottle feed. Consents to/Declines hep B vaccine. Consents to/Declines circumcision. Infant admitted to NBN for routine care/NICU for further management of care. Parents updated. Requested by OB to attend this  delivery at 30 weeks for prematurity . Mother is a 35 year old,  , blood type  O pos.  Prenatal labs as follow: HIV neg, RPR non-reactive, rubella immune, HBsA neg, GBS pos, pt was intact ptd, ROM at delivery, clear fluid.  Maternal history significant for severe Pre-eclampsia with severe features. Prenatal history significant for IUGRwith abnormal fetal Doppler. S/P Betamethasone course on -.  Rescue dose on 3/5. Infant emerged vertex; however, was breech before, vigorous, with good tone. Delayed cord clamping 30 secs, then brought to warmer. Baby was placed into poncho, on warming mattress. Suctioned and stimulated, CPAP+5 at 30% provided for retraction and work of breathing.  FiO2 titrated based on min of life.  Apgars 8/8. Transferred to NICU with CPAP.  Infant admitted to NICU for further management of care. Mother updated.

## 2023-03-05 NOTE — CHART NOTE - NSCHARTNOTEFT_GEN_A_CORE
OB /MFM Fellow Chart Note      36yo  @30w1d w/ sPEC and severe IUGR w/ elevated UAD is now requiring further IVP of antihypertensive medication after previously requiring 10mg procardia, 20mg labetalol IVP in addition to her standing meds. Pt is asymptomatic. Cnt on mg gtt and has received rescue BMTZ. As previously discussed and documented plan to proceed with delivery as risk of prematurity outweighed by progressive severe preeclampsia threatening maternal/fetal condition. Discussed plan with patient and she agreed. Discussed r/b/a of mode of delivery and opting for  to expedite delivery given current maternal medical status as well as very low likelihood of successful vaginal delivery given  nulliparous severe IUGR w/ abnormal  doppler status in setting of uncontrolled severe preeclampsia    Gera Parks MD  OB /MFM Fellow

## 2023-03-06 LAB
ALBUMIN SERPL ELPH-MCNC: 2.6 G/DL — LOW (ref 3.3–5)
ALP SERPL-CCNC: 102 U/L — SIGNIFICANT CHANGE UP (ref 40–120)
ALT FLD-CCNC: 33 U/L — SIGNIFICANT CHANGE UP (ref 10–45)
ANION GAP SERPL CALC-SCNC: 10 MMOL/L — SIGNIFICANT CHANGE UP (ref 5–17)
APTT BLD: 23.8 SEC — LOW (ref 27.5–35.5)
AST SERPL-CCNC: 37 U/L — SIGNIFICANT CHANGE UP (ref 10–40)
BASOPHILS # BLD AUTO: 0.02 K/UL — SIGNIFICANT CHANGE UP (ref 0–0.2)
BASOPHILS NFR BLD AUTO: 0.1 % — SIGNIFICANT CHANGE UP (ref 0–2)
BILIRUB SERPL-MCNC: <0.1 MG/DL — LOW (ref 0.2–1.2)
BUN SERPL-MCNC: 22 MG/DL — SIGNIFICANT CHANGE UP (ref 7–23)
CALCIUM SERPL-MCNC: 7.5 MG/DL — LOW (ref 8.4–10.5)
CHLORIDE SERPL-SCNC: 102 MMOL/L — SIGNIFICANT CHANGE UP (ref 96–108)
CO2 SERPL-SCNC: 20 MMOL/L — LOW (ref 22–31)
CREAT SERPL-MCNC: 0.85 MG/DL — SIGNIFICANT CHANGE UP (ref 0.5–1.3)
EGFR: 92 ML/MIN/1.73M2 — SIGNIFICANT CHANGE UP
EOSINOPHIL # BLD AUTO: 0.02 K/UL — SIGNIFICANT CHANGE UP (ref 0–0.5)
EOSINOPHIL NFR BLD AUTO: 0.1 % — SIGNIFICANT CHANGE UP (ref 0–6)
GLUCOSE SERPL-MCNC: 101 MG/DL — HIGH (ref 70–99)
HCT VFR BLD CALC: 24.3 % — LOW (ref 34.5–45)
HGB BLD-MCNC: 8 G/DL — LOW (ref 11.5–15.5)
IMM GRANULOCYTES NFR BLD AUTO: 1.6 % — HIGH (ref 0–0.9)
INR BLD: 0.79 RATIO — LOW (ref 0.88–1.16)
LDH SERPL L TO P-CCNC: 344 U/L — HIGH (ref 50–242)
LYMPHOCYTES # BLD AUTO: 10.9 % — LOW (ref 13–44)
LYMPHOCYTES # BLD AUTO: 2.12 K/UL — SIGNIFICANT CHANGE UP (ref 1–3.3)
MCHC RBC-ENTMCNC: 30 PG — SIGNIFICANT CHANGE UP (ref 27–34)
MCHC RBC-ENTMCNC: 32.9 GM/DL — SIGNIFICANT CHANGE UP (ref 32–36)
MCV RBC AUTO: 91 FL — SIGNIFICANT CHANGE UP (ref 80–100)
MONOCYTES # BLD AUTO: 1.15 K/UL — HIGH (ref 0–0.9)
MONOCYTES NFR BLD AUTO: 5.9 % — SIGNIFICANT CHANGE UP (ref 2–14)
NEUTROPHILS # BLD AUTO: 15.89 K/UL — HIGH (ref 1.8–7.4)
NEUTROPHILS NFR BLD AUTO: 81.4 % — HIGH (ref 43–77)
NRBC # BLD: 0 /100 WBCS — SIGNIFICANT CHANGE UP (ref 0–0)
PLATELET # BLD AUTO: 183 K/UL — SIGNIFICANT CHANGE UP (ref 150–400)
POTASSIUM SERPL-MCNC: 4.9 MMOL/L — SIGNIFICANT CHANGE UP (ref 3.5–5.3)
POTASSIUM SERPL-SCNC: 4.9 MMOL/L — SIGNIFICANT CHANGE UP (ref 3.5–5.3)
PROT SERPL-MCNC: 5.2 G/DL — LOW (ref 6–8.3)
PROTHROM AB SERPL-ACNC: 9.2 SEC — LOW (ref 10.5–13.4)
RBC # BLD: 2.67 M/UL — LOW (ref 3.8–5.2)
RBC # FLD: 14.4 % — SIGNIFICANT CHANGE UP (ref 10.3–14.5)
SODIUM SERPL-SCNC: 132 MMOL/L — LOW (ref 135–145)
URATE SERPL-MCNC: 6.3 MG/DL — SIGNIFICANT CHANGE UP (ref 2.5–7)
WBC # BLD: 19.52 K/UL — HIGH (ref 3.8–10.5)
WBC # FLD AUTO: 19.52 K/UL — HIGH (ref 3.8–10.5)

## 2023-03-06 PROCEDURE — 99232 SBSQ HOSP IP/OBS MODERATE 35: CPT | Mod: 25

## 2023-03-06 RX ORDER — LABETALOL HCL 100 MG
200 TABLET ORAL EVERY 12 HOURS
Refills: 0 | Status: DISCONTINUED | OUTPATIENT
Start: 2023-03-06 | End: 2023-03-09

## 2023-03-06 RX ORDER — IBUPROFEN 200 MG
600 TABLET ORAL EVERY 6 HOURS
Refills: 0 | Status: DISCONTINUED | OUTPATIENT
Start: 2023-03-06 | End: 2023-03-09

## 2023-03-06 RX ORDER — HEPARIN SODIUM 5000 [USP'U]/ML
5000 INJECTION INTRAVENOUS; SUBCUTANEOUS EVERY 12 HOURS
Refills: 0 | Status: DISCONTINUED | OUTPATIENT
Start: 2023-03-06 | End: 2023-03-09

## 2023-03-06 RX ADMIN — Medication 975 MILLIGRAM(S): at 10:13

## 2023-03-06 RX ADMIN — HEPARIN SODIUM 5000 UNIT(S): 5000 INJECTION INTRAVENOUS; SUBCUTANEOUS at 17:31

## 2023-03-06 RX ADMIN — Medication 60 MILLIGRAM(S): at 21:18

## 2023-03-06 RX ADMIN — Medication 975 MILLIGRAM(S): at 16:35

## 2023-03-06 RX ADMIN — Medication 30 MILLIGRAM(S): at 05:38

## 2023-03-06 RX ADMIN — SIMETHICONE 80 MILLIGRAM(S): 80 TABLET, CHEWABLE ORAL at 19:29

## 2023-03-06 RX ADMIN — Medication 30 MILLIGRAM(S): at 18:11

## 2023-03-06 RX ADMIN — Medication 30 MILLIGRAM(S): at 06:08

## 2023-03-06 RX ADMIN — Medication 30 MILLIGRAM(S): at 13:15

## 2023-03-06 RX ADMIN — Medication 600 MILLIGRAM(S): at 23:21

## 2023-03-06 RX ADMIN — Medication 1 TABLET(S): at 12:37

## 2023-03-06 RX ADMIN — SIMETHICONE 80 MILLIGRAM(S): 80 TABLET, CHEWABLE ORAL at 23:12

## 2023-03-06 RX ADMIN — Medication 975 MILLIGRAM(S): at 09:19

## 2023-03-06 RX ADMIN — Medication 975 MILLIGRAM(S): at 02:42

## 2023-03-06 RX ADMIN — Medication 975 MILLIGRAM(S): at 21:17

## 2023-03-06 RX ADMIN — Medication 30 MILLIGRAM(S): at 00:12

## 2023-03-06 RX ADMIN — Medication 30 MILLIGRAM(S): at 17:30

## 2023-03-06 RX ADMIN — Medication 30 MILLIGRAM(S): at 12:38

## 2023-03-06 RX ADMIN — Medication 60 MILLIGRAM(S): at 09:20

## 2023-03-06 RX ADMIN — Medication 975 MILLIGRAM(S): at 22:00

## 2023-03-06 RX ADMIN — Medication 300 MILLIGRAM(S): at 05:37

## 2023-03-06 RX ADMIN — Medication 975 MILLIGRAM(S): at 15:16

## 2023-03-06 RX ADMIN — Medication 200 MILLIGRAM(S): at 17:31

## 2023-03-06 RX ADMIN — Medication 975 MILLIGRAM(S): at 02:12

## 2023-03-06 NOTE — PHYSICAL THERAPY INITIAL EVALUATION ADULT - PERTINENT HX OF CURRENT PROBLEM, REHAB EVAL
36yo  at 28w6d GA transferred from [facility] for management of severe preeclampsia. Patient presented to [facility] with elevated blood pressures requiring Labetalol IVP 20/40/80mg and was started on Procardia 30XL. MgSO4 gtt started for seizure ppx. Scan at [facility] demonstrated breech presentation with severe IUGR (945g <1%ile) with elevated dopplers, intermittent AEDV.

## 2023-03-06 NOTE — PROGRESS NOTE ADULT - SUBJECTIVE AND OBJECTIVE BOX
Day 1 of Anesthesia Pain Management Service    SUBJECTIVE:  Pain Scale Score:          [X] Refer to charted pain scores    THERAPY:    s/p    100 mcg PF morphine on 3\5\2023      MEDICATIONS  (STANDING):  acetaminophen     Tablet .. 975 milliGRAM(s) Oral <User Schedule>  diphtheria/tetanus/pertussis (acellular) Vaccine (Adacel) 0.5 milliLiter(s) IntraMuscular once  heparin   Injectable 5000 Unit(s) SubCutaneous every 12 hours  ibuprofen  Tablet. 600 milliGRAM(s) Oral every 6 hours  ketorolac   Injectable 30 milliGRAM(s) IV Push every 6 hours  labetalol 300 milliGRAM(s) Oral every 8 hours  lactated ringers. 1000 milliLiter(s) (125 mL/Hr) IV Continuous <Continuous>  NIFEdipine XL 60 milliGRAM(s) Oral every 12 hours  oxytocin Infusion 333.333 milliUNIT(s)/Min (1000 mL/Hr) IV Continuous <Continuous>  polyethylene glycol 3350 17 Gram(s) Oral at bedtime  prenatal multivitamin 1 Tablet(s) Oral daily  senna 2 Tablet(s) Oral at bedtime    MEDICATIONS  (PRN):  dexAMETHasone  Injectable 4 milliGRAM(s) IV Push every 6 hours PRN Nausea  diphenhydrAMINE 25 milliGRAM(s) Oral every 6 hours PRN Pruritus  lanolin Ointment 1 Application(s) Topical every 6 hours PRN Sore Nipples  magnesium hydroxide Suspension 30 milliLiter(s) Oral two times a day PRN Constipation  ondansetron Injectable 4 milliGRAM(s) IV Push every 6 hours PRN Nausea  oxyCODONE    IR 5 milliGRAM(s) Oral every 3 hours PRN Moderate to Severe Pain (4-10)  simethicone 80 milliGRAM(s) Chew every 4 hours PRN Gas      OBJECTIVE:    Sedation:        	[X] Alert	 [ ] Drowsy	[ ] Arousable      [ ] Asleep       [ ] Unresponsive    Side Effects:	[X] None 	[ ] Nausea	[ ] Vomiting         [ ] Pruritus  		[ ] Weakness            [ ] Numbness	          [ ] Other:    Vital Signs Last 24 Hrs  T(C): 36.7 (06 Mar 2023 05:35), Max: 37.3 (05 Mar 2023 15:00)  T(F): 98 (06 Mar 2023 05:35), Max: 99.1 (05 Mar 2023 15:00)  HR: 65 (06 Mar 2023 05:35) (64 - 77)  BP: 112/70 (06 Mar 2023 05:35) (112/70 - 166/91)  BP(mean): 105 (05 Mar 2023 23:00) (92 - 122)  RR: 18 (06 Mar 2023 05:35) (11 - 28)  SpO2: 98% (06 Mar 2023 05:35) (93% - 98%)    Parameters below as of 06 Mar 2023 05:35  Patient On (Oxygen Delivery Method): room air        ASSESSMENT/ PLAN  [X] Patient transitioned to prn analgesics after 24 hours  [X] Pain management per primary service, pain service to sign off   [X]Documentation and Verification of current medications

## 2023-03-06 NOTE — PHYSICAL THERAPY INITIAL EVALUATION ADULT - GENERAL OBSERVATIONS, REHAB EVAL
Rec'd Rec'd semi supine in bed, NAD, VSS, A/Ox4, +IVL, [place] speaking,  used t/o (Ovidio, [id number]), agreeable to PT.

## 2023-03-06 NOTE — PHYSICAL THERAPY INITIAL EVALUATION ADULT - GAIT TRAINING, PT EVAL
GOAL: Patient will ambulate 100 feet independently with least restrictive assistive device in 2 weeks.

## 2023-03-06 NOTE — OCCUPATIONAL THERAPY INITIAL EVALUATION ADULT - PERTINENT HX OF CURRENT PROBLEM, REHAB EVAL
36yo  at 28w6d GA transferred from [facility] for management of severe preeclampsia. Patient presented to [facility] with elevated blood pressures requiring Labetalol IVP 20/40/80mg and was started on Procardia 30XL. MgSO4 gtt started for seizure ppx. Scan at [facility] demonstrated breech presentation with severe IUGR (945g <1%ile) with elevated dopplers, intermittent AEDV.

## 2023-03-06 NOTE — PHYSICAL THERAPY INITIAL EVALUATION ADULT - NSPTDISCHREC_GEN_A_CORE
Anticipate no skilled PT needs with assist from partner for ADLs and functional mobility as needed; pt limited by post-surgical pain at this time/No skilled PT needs

## 2023-03-06 NOTE — PROGRESS NOTE ADULT - SUBJECTIVE AND OBJECTIVE BOX
OB Progress Note:  Delivery, POD#1    S: 36yo POD#1 s/p LTCS . Pain well controlled, tolerating regular diet, not yet passing flatus, has not ambulated yet, asencio still in place, voiding spontaneously. Denies heavy vaginal bleeding, N/V, CP/SOB/lightheadedness/dizziness.     O:   Vital Signs Last 24 Hrs  T(C): 36.8 (06 Mar 2023 02:00), Max: 37.3 (05 Mar 2023 06:45)  T(F): 98.3 (06 Mar 2023 02:00), Max: 99.1 (05 Mar 2023 06:45)  HR: 69 (05 Mar 2023 23:30) (64 - 79)  BP: 136/85 (05 Mar 2023 23:30) (120/73 - 180/101)  BP(mean): 105 (05 Mar 2023 23:00) (92 - 122)  RR: 18 (06 Mar 2023 02:00) (11 - 28)  SpO2: 97% (06 Mar 2023 02:00) (92% - 99%)    Parameters below as of 06 Mar 2023 02:00  Patient On (Oxygen Delivery Method): room air    Labs:  Blood type: O Positive  Rubella IgG: RPR: Negative                          9.7<L>   18.55<H> >-----------< 171    (  @ 10:01 )             30.1<L>                        10.7<L>   11.11<H> >-----------< 173    (  @ 22:58 )             31.9<L>    23 @ 10:01      129<L>  |  101  |  22  ----------------------------<  119<H>  5.5<H>   |  16<L>  |  0.82    23 @ 22:58      134<L>  |  106  |  21  ----------------------------<  94  4.7   |  17<L>  |  0.79        Ca    8.3<L>      05 Mar 2023 10:01  Ca    8.7      04 Mar 2023 22:58  Phos  6.1<H>     03-  Mg     7.3<H>       Mg     7.5<H>       Mg     7.9<H>     05  Mg     6.8<H>         TPro  5.6<L>  /  Alb  2.7<L>  /  TBili  0.1<L>  /  DBili  x   /  AST  38  /  ALT  29  /  AlkPhos  121<H>  23 @ 10:01  TPro  5.9<L>  /  Alb  2.8<L>  /  TBili  <0.1<L>  /  DBili  x   /  AST  31  /  ALT  28  /  AlkPhos  151<H>  23 @ 22:58      PE:  General: NAD  Abdomen: Mildly distended, appropriately tender, Fundus firm,   Incision: c/d/i  VE: No heavy vaginal bleeding       OB Progress Note:  Delivery, POD#1    S: 38yo POD#1 s/p pLTCS . Pain well controlled, tolerating regular diet, not yet passing flatus, has not ambulated yet, voiding spontaneously. Denies heavy vaginal bleeding, N/V, CP/SOB/lightheadedness/dizziness.     O:   Vital Signs Last 24 Hrs  T(C): 36.8 (06 Mar 2023 02:00), Max: 37.3 (05 Mar 2023 06:45)  T(F): 98.3 (06 Mar 2023 02:00), Max: 99.1 (05 Mar 2023 06:45)  HR: 69 (05 Mar 2023 23:30) (64 - 79)  BP: 136/85 (05 Mar 2023 23:30) (120/73 - 180/101)  BP(mean): 105 (05 Mar 2023 23:00) (92 - 122)  RR: 18 (06 Mar 2023 02:00) (11 - 28)  SpO2: 97% (06 Mar 2023 02:00) (92% - 99%)    Parameters below as of 06 Mar 2023 02:00  Patient On (Oxygen Delivery Method): room air    Labs:  Blood type: O Positive  Rubella IgG: RPR: Negative                          9.7<L>   18.55<H> >-----------< 171    (  @ 10:01 )             30.1<L>                        10.7<L>   11.11<H> >-----------< 173    (  @ 22:58 )             31.9<L>    23 @ 10:01      129<L>  |  101  |  22  ----------------------------<  119<H>  5.5<H>   |  16<L>  |  0.82    23 @ 22:58      134<L>  |  106  |  21  ----------------------------<  94  4.7   |  17<L>  |  0.79        Ca    8.3<L>      05 Mar 2023 10:01  Ca    8.7      04 Mar 2023 22:58  Phos  6.1<H>       Mg     7.3<H>       Mg     7.5<H>       Mg     7.9<H>     05  Mg     6.8<H>         TPro  5.6<L>  /  Alb  2.7<L>  /  TBili  0.1<L>  /  DBili  x   /  AST  38  /  ALT  29  /  AlkPhos  121<H>  23 @ 10:01  TPro  5.9<L>  /  Alb  2.8<L>  /  TBili  <0.1<L>  /  DBili  x   /  AST  31  /  ALT  28  /  AlkPhos  151<H>  23 @ 22:58      PE:  General: NAD  Abdomen: Mildly distended, appropriately tender, Fundus firm,   Incision: c/d/i  VE: No heavy vaginal bleeding

## 2023-03-06 NOTE — PROGRESS NOTE ADULT - ASSESSMENT
A/P: 38yo with PNC c/b severe preeclampsia and FGR with elevated UAD POD#1 s/p pLTCS @30w1d for difficult to control BP's with postoperative course c/b hypertension not responsive to antihypertensives requiring monitoring in the SICU on POD#0 downgraded to the floor on POD#0. EBL 800ml. Patient is recovering appropriately with no sx of PEC.     #sPEC  - HELLP labs reviewed       -- Cr uptrendin.44->0.82, continue to monitor  - P/C: 14.2  - s/p MgSO4 (3/5-3/6) for seizure ppx  - Con't Procardia 30XL BID + Labetalol 9=985rb TID        -- s/p Labetalol 20/40/80 prior to transfer       -- s/p Procardia 10/10 IR ()         -- s/p Lab 20 and Procardia 10 (3/4)       -- s/p hydral 5(3/5)  - f/u AM HELLP labs  - Con't to monitor BPs     #Postoperative state  - Continue regular diet  - Dc asencio  - Increase ambulation  - Continue motrin, tylenol, oxycodone PRN for pain control.      Jackie Velásquez, PGY-3 A/P: 36yo with PNC c/b severe preeclampsia and FGR with elevated UAD POD#1 s/p pLTCS @30w1d for difficult to control BP's with postoperative course c/b hypertension not responsive to antihypertensives requiring monitoring in the SICU on POD#0 downgraded to the floor on POD#0. EBL 800ml. Patient is recovering appropriately with no sx of PEC.     #sPEC  - HELLP labs reviewed       -- Cr uptrendin.44->0.82, continue to monitor  - P/C: 14.2  - s/p MgSO4 (3/5-3/6) for seizure ppx  - Con't Procardia 30XL BID + Labetalol 6=263kc TID        -- s/p Labetalol 20/40/80 prior to transfer       -- s/p Procardia 10/10 IR ()         -- s/p Lab 20 and Procardia 10 (3/4)       -- s/p hydral 5(3/5)  - f/u AM HELLP labs  - Con't to monitor BPs     #Postoperative state  - COVID+ (3/3) asymptomatic  - Continue regular diet  - DTV@12p  - Increase ambulation  - Continue motrin, tylenol, oxycodone PRN for pain control.      Jackie Velásquez, PGY-3 A/P: 38yo with PNC c/b severe preeclampsia and FGR with elevated UAD POD#1 s/p pLTCS @30w1d for difficult to control BP's with postoperative course c/b hypertension not responsive to antihypertensives requiring monitoring in the SICU on POD#0 downgraded to the floor on POD#0. EBL 800ml. Patient is recovering appropriately with no sx of PEC.     #sPEC  - HELLP labs reviewed       -- Cr uptrendin.44->0.82, continue to monitor  - P/C: 14.2  - s/p MgSO4 (3/5-3/6) for seizure ppx  - Con't Procardia 30XL BID + Labetalol 8=641eg TID        -- s/p Labetalol 20/40/80 prior to transfer       -- s/p Procardia 10/10 IR ()         -- s/p Lab 20 and Procardia 10 (3/4)       -- s/p hydral 5(3/5)  - f/u AM HELLP labs  - Con't to monitor BPs     #Postoperative state  - COVID+ (3/3) asymptomatic  - Continue regular diet  - DTV@12p  - Increase ambulation  - Continue motrin, tylenol, oxycodone PRN for pain control.      Jackie Didier, PGY-3    MFM Addendum   38yo  now POD#1 s/p pLTCS @30w1d for severe PEC and uncontrolled BPs, who was in the SICU on POD#0 however downgraded to the floor on POD#0. Today she is feeling overall well with no headache, vision changes or RUQ pain. Patient is progressing appropriately postoperatively with pain with ambulation. Blood pressures are significantly improved and plan to down titrate antihypertensive medications as needed. Currently on procardia 60/60 and labetalol 300 TID, will decrease labetalol to 200BID. Continue close BP monitoring.     Carly Hirschberg, MFM Fellow   Patient seen and examined with CAROLYN Martinez Attending

## 2023-03-06 NOTE — OCCUPATIONAL THERAPY INITIAL EVALUATION ADULT - PREDICTED DURATION OF THERAPY (DAYS/WKS), OT EVAL
Patient is functionally independent, however limited by post-surgical pain no skilled OT intervention is needed. Pt reports partner will be available to assist with ADLs and functional activities at home

## 2023-03-06 NOTE — PHYSICAL THERAPY INITIAL EVALUATION ADULT - PLANNED THERAPY INTERVENTIONS, PT EVAL
GOAL: Stair Negotiation Training: Patient will be able to negotiate up & down 1 flight of stairs independently with unilateral rail, step to gait pattern, in 2 weeks./balance training/bed mobility training/gait training/strengthening

## 2023-03-06 NOTE — PROGRESS NOTE ADULT - REASON FOR ADMISSION
C/S
Preeclampsia
Preeclampsia; COVID 19 (asymptomatic)
MFTYREE

## 2023-03-06 NOTE — PHYSICAL THERAPY INITIAL EVALUATION ADULT - ADDITIONAL COMMENTS
Pt states she lives with her partner and family members in a private home, pt states there are 6 stairs inside. Pt was independent with ADLs and functional mobility PTA without an AD.

## 2023-03-07 LAB
ALBUMIN SERPL ELPH-MCNC: 2.6 G/DL — LOW (ref 3.3–5)
ALP SERPL-CCNC: 89 U/L — SIGNIFICANT CHANGE UP (ref 40–120)
ALT FLD-CCNC: 25 U/L — SIGNIFICANT CHANGE UP (ref 10–45)
ANION GAP SERPL CALC-SCNC: 9 MMOL/L — SIGNIFICANT CHANGE UP (ref 5–17)
APTT BLD: 28.8 SEC — SIGNIFICANT CHANGE UP (ref 27.5–35.5)
AST SERPL-CCNC: 20 U/L — SIGNIFICANT CHANGE UP (ref 10–40)
BILIRUB SERPL-MCNC: <0.1 MG/DL — LOW (ref 0.2–1.2)
BUN SERPL-MCNC: 12 MG/DL — SIGNIFICANT CHANGE UP (ref 7–23)
CALCIUM SERPL-MCNC: 8.2 MG/DL — LOW (ref 8.4–10.5)
CHLORIDE SERPL-SCNC: 108 MMOL/L — SIGNIFICANT CHANGE UP (ref 96–108)
CO2 SERPL-SCNC: 22 MMOL/L — SIGNIFICANT CHANGE UP (ref 22–31)
CREAT SERPL-MCNC: 0.63 MG/DL — SIGNIFICANT CHANGE UP (ref 0.5–1.3)
DRVVT RATIO: 1.11 RATIO — SIGNIFICANT CHANGE UP (ref 0–1.21)
DRVVT SCREEN TO CONFIRM RATIO: SIGNIFICANT CHANGE UP
EGFR: 119 ML/MIN/1.73M2 — SIGNIFICANT CHANGE UP
FIBRINOGEN PPP-MCNC: 506 MG/DL — HIGH (ref 200–445)
GLUCOSE SERPL-MCNC: 75 MG/DL — SIGNIFICANT CHANGE UP (ref 70–99)
HCT VFR BLD CALC: 22.3 % — LOW (ref 34.5–45)
HGB BLD-MCNC: 7.4 G/DL — LOW (ref 11.5–15.5)
INR BLD: 0.78 RATIO — LOW (ref 0.88–1.16)
LDH SERPL L TO P-CCNC: 263 U/L — HIGH (ref 50–242)
MCHC RBC-ENTMCNC: 30 PG — SIGNIFICANT CHANGE UP (ref 27–34)
MCHC RBC-ENTMCNC: 33.2 GM/DL — SIGNIFICANT CHANGE UP (ref 32–36)
MCV RBC AUTO: 90.3 FL — SIGNIFICANT CHANGE UP (ref 80–100)
NORMALIZED SCT PPP-RTO: 1.12 RATIO — SIGNIFICANT CHANGE UP (ref 0–1.16)
NORMALIZED SCT PPP-RTO: SIGNIFICANT CHANGE UP
NRBC # BLD: 0 /100 WBCS — SIGNIFICANT CHANGE UP (ref 0–0)
PLATELET # BLD AUTO: 191 K/UL — SIGNIFICANT CHANGE UP (ref 150–400)
POTASSIUM SERPL-MCNC: 4.2 MMOL/L — SIGNIFICANT CHANGE UP (ref 3.5–5.3)
POTASSIUM SERPL-SCNC: 4.2 MMOL/L — SIGNIFICANT CHANGE UP (ref 3.5–5.3)
PROT SERPL-MCNC: 4.9 G/DL — LOW (ref 6–8.3)
PROTHROM AB SERPL-ACNC: 9 SEC — LOW (ref 10.5–13.4)
RBC # BLD: 2.47 M/UL — LOW (ref 3.8–5.2)
RBC # FLD: 14.8 % — HIGH (ref 10.3–14.5)
SODIUM SERPL-SCNC: 139 MMOL/L — SIGNIFICANT CHANGE UP (ref 135–145)
URATE SERPL-MCNC: 5.7 MG/DL — SIGNIFICANT CHANGE UP (ref 2.5–7)
WBC # BLD: 18.02 K/UL — HIGH (ref 3.8–10.5)
WBC # FLD AUTO: 18.02 K/UL — HIGH (ref 3.8–10.5)

## 2023-03-07 PROCEDURE — 99232 SBSQ HOSP IP/OBS MODERATE 35: CPT | Mod: 25

## 2023-03-07 RX ADMIN — Medication 200 MILLIGRAM(S): at 05:01

## 2023-03-07 RX ADMIN — Medication 975 MILLIGRAM(S): at 10:00

## 2023-03-07 RX ADMIN — Medication 975 MILLIGRAM(S): at 02:32

## 2023-03-07 RX ADMIN — Medication 975 MILLIGRAM(S): at 09:12

## 2023-03-07 RX ADMIN — Medication 975 MILLIGRAM(S): at 21:22

## 2023-03-07 RX ADMIN — Medication 975 MILLIGRAM(S): at 03:15

## 2023-03-07 RX ADMIN — Medication 60 MILLIGRAM(S): at 09:13

## 2023-03-07 RX ADMIN — SIMETHICONE 80 MILLIGRAM(S): 80 TABLET, CHEWABLE ORAL at 21:23

## 2023-03-07 RX ADMIN — Medication 60 MILLIGRAM(S): at 21:22

## 2023-03-07 RX ADMIN — Medication 600 MILLIGRAM(S): at 00:20

## 2023-03-07 RX ADMIN — Medication 1 TABLET(S): at 14:48

## 2023-03-07 RX ADMIN — Medication 975 MILLIGRAM(S): at 14:48

## 2023-03-07 RX ADMIN — Medication 975 MILLIGRAM(S): at 22:13

## 2023-03-07 RX ADMIN — SIMETHICONE 80 MILLIGRAM(S): 80 TABLET, CHEWABLE ORAL at 05:02

## 2023-03-07 RX ADMIN — Medication 200 MILLIGRAM(S): at 16:47

## 2023-03-07 RX ADMIN — Medication 975 MILLIGRAM(S): at 15:30

## 2023-03-07 RX ADMIN — Medication 600 MILLIGRAM(S): at 05:45

## 2023-03-07 RX ADMIN — HEPARIN SODIUM 5000 UNIT(S): 5000 INJECTION INTRAVENOUS; SUBCUTANEOUS at 05:02

## 2023-03-07 RX ADMIN — Medication 600 MILLIGRAM(S): at 05:01

## 2023-03-07 NOTE — PROGRESS NOTE ADULT - ASSESSMENT
A/P: 36yo with PNC c/b severe preeclampsia and FGR with elevated UAD POD#2 s/p pLTCS @30w1d for difficult to control BP's with postoperative course c/b hypertension not responsive to antihypertensives requiring monitoring in the SICU on POD#0 downgraded to the floor on POD#0. EBL 800ml. Patient is recovering appropriately with no sx of PEC. Antihypertensive regimen downgraded yesterday. BP's mild range overnight.    #sPEC  - HELLP labs reviewed       -- Cr uptrendin.44->0.85, continue to monitor  - P/C: 14.2  - s/p MgSO4 (3/5-3/6) for seizure ppx  - Con't Procardia 30XL BID + Labetalol 200mg TID        -- s/p Labetalol 20/40/80 prior to transfer       -- s/p Procardia 10/10 IR ()         -- s/p Lab 20 and Procardia 10 (3/4)       -- s/p hydral 5(3/5)  - f/u AM HELLP labs  - Con't to monitor BPs     #Postoperative state  - COVID+ (3/3) asymptomatic  - Continue regular diet  - Increase ambulation  - Continue motrin, tylenol, oxycodone PRN for pain control    Jackie Velásquez, PGY-3 A/P: 38yo with PNC c/b severe preeclampsia and FGR with elevated UAD POD#2 s/p pLTCS @30w1d for difficult to control BP's with postoperative course c/b hypertension not responsive to antihypertensives requiring monitoring in the SICU on POD#0 downgraded to the floor on POD#0. EBL 800ml. Patient is recovering appropriately with no sx of PEC. Antihypertensive regimen downgraded yesterday. BP's mild range overnight.    #sPEC  - HELLP labs reviewed       -- Cr uptrendin.44->0.85, continue to monitor  - P/C: 14.2  - s/p MgSO4 (3/5-3/6) for seizure ppx  - Con't Procardia 30XL BID + Labetalol 200mg TID        -- s/p Labetalol 20/40/80 prior to transfer       -- s/p Procardia 10/10 IR ()         -- s/p Lab 20 and Procardia 10 (3/4)       -- s/p hydral 5(3/5)  - f/u AM HELLP labs  - Con't to monitor BPs     #Postoperative state  - COVID+ (3/3) asymptomatic  - Continue regular diet  - Increase ambulation  - Continue motrin, tylenol, oxycodone PRN for pain control  - BC: Jono Velásquez, PGY-3

## 2023-03-07 NOTE — PROGRESS NOTE ADULT - ASSESSMENT
A/P: 38yo  now POD#1 s/p pLTCS @30w1d for severe PEC and uncontrolled BPs. Today she is feeling overall well with no headache, vision changes or RUQ pain. Patient is progressing appropriately postoperatively. Blood pressures are significantly improved, currently on procardia 60/60XL and labetalol to 200BID. Continue close BP monitoring and postoperative care.   - Routine postoperative care   - Continue BP monitoring   - Plan to titrate antihypertensives as needed  - Patient interested in nexplanon, to be placed prior to discharge     Carly Hirschberg, MFM Fellow   Patient seen and examined with CAROLYN Martinez Attending

## 2023-03-07 NOTE — PROGRESS NOTE ADULT - SUBJECTIVE AND OBJECTIVE BOX
Utilized at Bedside     Patient reports feeling overall well. She is ambulating without difficulty and tolerating a regular diet. She reports pain overall well controlled and it just bothers her when she walks to and from the bathroom. She denies headache, vision changes or RUQ pain.     ICU Vital Signs Last 24 Hrs  T(C): 36.6 (07 Mar 2023 05:00), Max: 37.1 (06 Mar 2023 13:00)  T(F): 97.9 (07 Mar 2023 05:00), Max: 98.7 (06 Mar 2023 13:00)  HR: 78 (07 Mar 2023 05:00) (75 - 79)  BP: 132/79 (07 Mar 2023 05:00) (123/76 - 133/85)  RR: 18 (07 Mar 2023 05:00) (18 - 18)  SpO2: 97% (07 Mar 2023 05:00) (97% - 97%)    O2 Parameters below as of 07 Mar 2023 05:00  Patient On (Oxygen Delivery Method): room air    Gen: NAD   Pulm: nonlabored breathing   CV: RRR   Patient standing at time of exam

## 2023-03-07 NOTE — PROGRESS NOTE ADULT - SUBJECTIVE AND OBJECTIVE BOX
OB Progress Note: LTCS, POD#2    :    S: 36yo POD#2 s/p LTCS. Pain well controlled, tolerating regular diet, passing flatus, voiding spontaneously, ambulating without difficulty. Denies heavy vaginal bleeding, CP/SOB, lightheadedness/dizziness, nausea/vomiting,     O:  Vitals:  Vital Signs Last 24 Hrs  T(C): 36.8 (06 Mar 2023 23:58), Max: 37.1 (06 Mar 2023 13:00)  T(F): 98.2 (06 Mar 2023 23:58), Max: 98.7 (06 Mar 2023 13:00)  HR: 75 (06 Mar 2023 23:58) (65 - 79)  BP: 124/76 (06 Mar 2023 23:58) (112/68 - 133/85)  BP(mean): --  RR: 18 (06 Mar 2023 23:58) (18 - 18)  SpO2: 97% (06 Mar 2023 23:58) (96% - 98%)    Parameters below as of 06 Mar 2023 23:58  Patient On (Oxygen Delivery Method): room air        MEDICATIONS  (STANDING):  acetaminophen     Tablet .. 975 milliGRAM(s) Oral <User Schedule>  diphtheria/tetanus/pertussis (acellular) Vaccine (Adacel) 0.5 milliLiter(s) IntraMuscular once  heparin   Injectable 5000 Unit(s) SubCutaneous every 12 hours  ibuprofen  Tablet. 600 milliGRAM(s) Oral every 6 hours  labetalol 200 milliGRAM(s) Oral every 12 hours  lactated ringers. 1000 milliLiter(s) (125 mL/Hr) IV Continuous <Continuous>  NIFEdipine XL 60 milliGRAM(s) Oral every 12 hours  oxytocin Infusion 333.333 milliUNIT(s)/Min (1000 mL/Hr) IV Continuous <Continuous>  polyethylene glycol 3350 17 Gram(s) Oral at bedtime  prenatal multivitamin 1 Tablet(s) Oral daily  senna 2 Tablet(s) Oral at bedtime      MEDICATIONS  (PRN):  dexAMETHasone  Injectable 4 milliGRAM(s) IV Push every 6 hours PRN Nausea  diphenhydrAMINE 25 milliGRAM(s) Oral every 6 hours PRN Pruritus  lanolin Ointment 1 Application(s) Topical every 6 hours PRN Sore Nipples  magnesium hydroxide Suspension 30 milliLiter(s) Oral two times a day PRN Constipation  ondansetron Injectable 4 milliGRAM(s) IV Push every 6 hours PRN Nausea  oxyCODONE    IR 5 milliGRAM(s) Oral every 3 hours PRN Moderate to Severe Pain (4-10)  simethicone 80 milliGRAM(s) Chew every 4 hours PRN Gas      Labs:  Blood type: O Positive  Rubella IgG: RPR: Negative                          8.0<L>   19.52<H> >-----------< 183    ( 03-06 @ 06:44 )             24.3<L>                        9.7<L>   18.55<H> >-----------< 171    ( 03-05 @ 10:01 )             30.1<L>                        10.7<L>   11.11<H> >-----------< 173    ( 03-04 @ 22:58 )             31.9<L>    03-06-23 @ 06:44      132<L>  |  102  |  22  ----------------------------<  101<H>  4.9   |  20<L>  |  0.85    03-05-23 @ 10:01      129<L>  |  101  |  22  ----------------------------<  119<H>  5.5<H>   |  16<L>  |  0.82    03-04-23 @ 22:58      134<L>  |  106  |  21  ----------------------------<  94  4.7   |  17<L>  |  0.79        Ca    7.5<L>      06 Mar 2023 06:44  Ca    8.3<L>      05 Mar 2023 10:01  Ca    8.7      04 Mar 2023 22:58  Phos  6.1<H>     03-05  Mg     7.3<H>     03-05  Mg     7.5<H>     03-05  Mg     7.9<H>     03-05  Mg     6.8<H>     03-05    TPro  5.2<L>  /  Alb  2.6<L>  /  TBili  <0.1<L>  /  DBili  x   /  AST  37  /  ALT  33  /  AlkPhos  102  03-06-23 @ 06:44  TPro  5.6<L>  /  Alb  2.7<L>  /  TBili  0.1<L>  /  DBili  x   /  AST  38  /  ALT  29  /  AlkPhos  121<H>  03-05-23 @ 10:01  TPro  5.9<L>  /  Alb  2.8<L>  /  TBili  <0.1<L>  /  DBili  x   /  AST  31  /  ALT  28  /  AlkPhos  151<H>  03-04-23 @ 22:58      PE:  General: NAD  Abdomen: Soft, appropriately tender, Fundus firm incision c/d/i.  VE: No heavy vaginal bleeding  Extremities: No erythema, no pitting edema     OB Progress Note: LTCS, POD#2    : [id number]    S: 38yo POD#2 s/p LTCS. Pain well controlled, tolerating regular diet, passing flatus, voiding spontaneously, ambulating without difficulty. Denies heavy vaginal bleeding, CP/SOB, lightheadedness/dizziness, nausea/vomiting,     O:  Vitals:  Vital Signs Last 24 Hrs  T(C): 36.8 (06 Mar 2023 23:58), Max: 37.1 (06 Mar 2023 13:00)  T(F): 98.2 (06 Mar 2023 23:58), Max: 98.7 (06 Mar 2023 13:00)  HR: 75 (06 Mar 2023 23:58) (65 - 79)  BP: 124/76 (06 Mar 2023 23:58) (112/68 - 133/85)  BP(mean): --  RR: 18 (06 Mar 2023 23:58) (18 - 18)  SpO2: 97% (06 Mar 2023 23:58) (96% - 98%)    Parameters below as of 06 Mar 2023 23:58  Patient On (Oxygen Delivery Method): room air        MEDICATIONS  (STANDING):  acetaminophen     Tablet .. 975 milliGRAM(s) Oral <User Schedule>  diphtheria/tetanus/pertussis (acellular) Vaccine (Adacel) 0.5 milliLiter(s) IntraMuscular once  heparin   Injectable 5000 Unit(s) SubCutaneous every 12 hours  ibuprofen  Tablet. 600 milliGRAM(s) Oral every 6 hours  labetalol 200 milliGRAM(s) Oral every 12 hours  lactated ringers. 1000 milliLiter(s) (125 mL/Hr) IV Continuous <Continuous>  NIFEdipine XL 60 milliGRAM(s) Oral every 12 hours  oxytocin Infusion 333.333 milliUNIT(s)/Min (1000 mL/Hr) IV Continuous <Continuous>  polyethylene glycol 3350 17 Gram(s) Oral at bedtime  prenatal multivitamin 1 Tablet(s) Oral daily  senna 2 Tablet(s) Oral at bedtime      MEDICATIONS  (PRN):  dexAMETHasone  Injectable 4 milliGRAM(s) IV Push every 6 hours PRN Nausea  diphenhydrAMINE 25 milliGRAM(s) Oral every 6 hours PRN Pruritus  lanolin Ointment 1 Application(s) Topical every 6 hours PRN Sore Nipples  magnesium hydroxide Suspension 30 milliLiter(s) Oral two times a day PRN Constipation  ondansetron Injectable 4 milliGRAM(s) IV Push every 6 hours PRN Nausea  oxyCODONE    IR 5 milliGRAM(s) Oral every 3 hours PRN Moderate to Severe Pain (4-10)  simethicone 80 milliGRAM(s) Chew every 4 hours PRN Gas      Labs:  Blood type: O Positive  Rubella IgG: RPR: Negative                          8.0<L>   19.52<H> >-----------< 183    ( 03-06 @ 06:44 )             24.3<L>                        9.7<L>   18.55<H> >-----------< 171    ( 03-05 @ 10:01 )             30.1<L>                        10.7<L>   11.11<H> >-----------< 173    ( 03-04 @ 22:58 )             31.9<L>    03-06-23 @ 06:44      132<L>  |  102  |  22  ----------------------------<  101<H>  4.9   |  20<L>  |  0.85    03-05-23 @ 10:01      129<L>  |  101  |  22  ----------------------------<  119<H>  5.5<H>   |  16<L>  |  0.82    03-04-23 @ 22:58      134<L>  |  106  |  21  ----------------------------<  94  4.7   |  17<L>  |  0.79        Ca    7.5<L>      06 Mar 2023 06:44  Ca    8.3<L>      05 Mar 2023 10:01  Ca    8.7      04 Mar 2023 22:58  Phos  6.1<H>     03-05  Mg     7.3<H>     03-05  Mg     7.5<H>     03-05  Mg     7.9<H>     03-05  Mg     6.8<H>     03-05    TPro  5.2<L>  /  Alb  2.6<L>  /  TBili  <0.1<L>  /  DBili  x   /  AST  37  /  ALT  33  /  AlkPhos  102  03-06-23 @ 06:44  TPro  5.6<L>  /  Alb  2.7<L>  /  TBili  0.1<L>  /  DBili  x   /  AST  38  /  ALT  29  /  AlkPhos  121<H>  03-05-23 @ 10:01  TPro  5.9<L>  /  Alb  2.8<L>  /  TBili  <0.1<L>  /  DBili  x   /  AST  31  /  ALT  28  /  AlkPhos  151<H>  03-04-23 @ 22:58      PE:  General: NAD  Abdomen: Soft, appropriately tender, Fundus firm incision c/d/i.  VE: No heavy vaginal bleeding  Extremities: No erythema, no pitting edema

## 2023-03-08 PROCEDURE — 99232 SBSQ HOSP IP/OBS MODERATE 35: CPT | Mod: 25

## 2023-03-08 RX ADMIN — Medication 600 MILLIGRAM(S): at 17:29

## 2023-03-08 RX ADMIN — Medication 600 MILLIGRAM(S): at 12:39

## 2023-03-08 RX ADMIN — Medication 600 MILLIGRAM(S): at 13:30

## 2023-03-08 RX ADMIN — Medication 200 MILLIGRAM(S): at 17:29

## 2023-03-08 RX ADMIN — Medication 975 MILLIGRAM(S): at 09:18

## 2023-03-08 RX ADMIN — Medication 600 MILLIGRAM(S): at 05:00

## 2023-03-08 RX ADMIN — HEPARIN SODIUM 5000 UNIT(S): 5000 INJECTION INTRAVENOUS; SUBCUTANEOUS at 17:29

## 2023-03-08 RX ADMIN — Medication 975 MILLIGRAM(S): at 10:05

## 2023-03-08 RX ADMIN — Medication 200 MILLIGRAM(S): at 05:00

## 2023-03-08 RX ADMIN — Medication 60 MILLIGRAM(S): at 09:18

## 2023-03-08 RX ADMIN — Medication 1 TABLET(S): at 12:39

## 2023-03-08 RX ADMIN — Medication 600 MILLIGRAM(S): at 00:15

## 2023-03-08 RX ADMIN — Medication 600 MILLIGRAM(S): at 05:54

## 2023-03-08 RX ADMIN — Medication 600 MILLIGRAM(S): at 01:15

## 2023-03-08 RX ADMIN — HEPARIN SODIUM 5000 UNIT(S): 5000 INJECTION INTRAVENOUS; SUBCUTANEOUS at 05:00

## 2023-03-08 RX ADMIN — Medication 975 MILLIGRAM(S): at 21:35

## 2023-03-08 RX ADMIN — Medication 60 MILLIGRAM(S): at 21:35

## 2023-03-08 RX ADMIN — Medication 975 MILLIGRAM(S): at 22:10

## 2023-03-08 RX ADMIN — Medication 975 MILLIGRAM(S): at 15:40

## 2023-03-08 RX ADMIN — Medication 600 MILLIGRAM(S): at 18:20

## 2023-03-08 RX ADMIN — Medication 975 MILLIGRAM(S): at 16:30

## 2023-03-08 NOTE — PROGRESS NOTE ADULT - SUBJECTIVE AND OBJECTIVE BOX
[facility] Progress Note     [facility] Int #[id number] Utilized     Patient feeling well this morning. Pain improving and only present when ambulating. Tolerating regular diet at this time. Patient reports feeling overall well. Denies headache/vision changes/abdominal pain.     ICU Vital Signs Last 24 Hrs  T(C): 36.8 (08 Mar 2023 13:00), Max: 37.1 (07 Mar 2023 16:45)  T(F): 98.2 (08 Mar 2023 13:00), Max: 98.8 (07 Mar 2023 16:45)  HR: 76 (08 Mar 2023 13:41) (74 - 96)  BP: 154/99 (08 Mar 2023 13:41) (136/82 - 154/99)  BP(mean): --  ABP: --  ABP(mean): --  RR: 18 (08 Mar 2023 13:00) (18 - 18)  SpO2: 98% (08 Mar 2023 13:00) (96% - 99%)    O2 Parameters below as of 08 Mar 2023 13:00  Patient On (Oxygen Delivery Method): room air      Gen: NAD   Pulm: non labored breathing   CV: RRR   Sitting up comfortably eating lunch

## 2023-03-08 NOTE — PROGRESS NOTE ADULT - ASSESSMENT
A/P: 38yo  now POD#2 s/p pLTCS @30w1d for severe PEC and uncontrolled BPs. Today she is feeling overall well with no headache, vision changes or RUQ pain. Patient is progressing appropriately postoperatively. Blood pressures are stable on procardia 60/60XL and labetalol to 200BID. Continue close BP monitoring and postoperative care.   - Routine postoperative care   - Continue BP monitoring   - Plan to titrate antihypertensives as needed  - Patient interested in nexplanon, to be placed prior to discharge     Carly Hirschberg, MFM Fellow   Patient seen and examined with CAROLYN Martinez Attending

## 2023-03-08 NOTE — PROGRESS NOTE ADULT - SUBJECTIVE AND OBJECTIVE BOX
OB Postpartum Note:  Delivery, POD#3    :    S: 38yo POD#3 s/p LTCS. The patient feels well.  Pain is well controlled. She is tolerating a regular diet and passing flatus. She is voiding spontaneously, and ambulating without difficulty. Denies CP/SOB. Denies lightheadedness/dizziness. Denies N/V.    O:  Vitals:  Vital Signs Last 24 Hrs  T(C): 36.6 (08 Mar 2023 00:27), Max: 37.1 (07 Mar 2023 16:45)  T(F): 97.8 (08 Mar 2023 00:27), Max: 98.8 (07 Mar 2023 16:45)  HR: 87 (08 Mar 2023 00:27) (78 - 96)  BP: 151/87 (08 Mar 2023 00:27) (129/81 - 151/87)  BP(mean): --  RR: 18 (08 Mar 2023 00:27) (17 - 18)  SpO2: 96% (08 Mar 2023 00:27) (96% - 97%)    Parameters below as of 08 Mar 2023 00:27  Patient On (Oxygen Delivery Method): room air    MEDICATIONS  (STANDING):  acetaminophen     Tablet .. 975 milliGRAM(s) Oral <User Schedule>  diphtheria/tetanus/pertussis (acellular) Vaccine (Adacel) 0.5 milliLiter(s) IntraMuscular once  heparin   Injectable 5000 Unit(s) SubCutaneous every 12 hours  ibuprofen  Tablet. 600 milliGRAM(s) Oral every 6 hours  labetalol 200 milliGRAM(s) Oral every 12 hours  lactated ringers. 1000 milliLiter(s) (125 mL/Hr) IV Continuous <Continuous>  NIFEdipine XL 60 milliGRAM(s) Oral every 12 hours  oxytocin Infusion 333.333 milliUNIT(s)/Min (1000 mL/Hr) IV Continuous <Continuous>  polyethylene glycol 3350 17 Gram(s) Oral at bedtime  prenatal multivitamin 1 Tablet(s) Oral daily  senna 2 Tablet(s) Oral at bedtime    MEDICATIONS  (PRN):  dexAMETHasone  Injectable 4 milliGRAM(s) IV Push every 6 hours PRN Nausea  diphenhydrAMINE 25 milliGRAM(s) Oral every 6 hours PRN Pruritus  lanolin Ointment 1 Application(s) Topical every 6 hours PRN Sore Nipples  magnesium hydroxide Suspension 30 milliLiter(s) Oral two times a day PRN Constipation  ondansetron Injectable 4 milliGRAM(s) IV Push every 6 hours PRN Nausea  oxyCODONE    IR 5 milliGRAM(s) Oral every 3 hours PRN Moderate to Severe Pain (4-10)  simethicone 80 milliGRAM(s) Chew every 4 hours PRN Gas      LABS:  Blood type: O Positive  Rubella IgG: RPR: Negative                          7.4<L>   18.02<H> >-----------< 191    (  @ 05:22 )             22.3<L>                        8.0<L>   19.52<H> >-----------< 183    (  @ 06:44 )             24.3<L>                        9.7<L>   18.55<H> >-----------< 171    (  @ 10:01 )             30.1<L>    23 @ 05:22      139  |  108  |  12  ----------------------------<  75  4.2   |  22  |  0.63    23 @ 06:44      132<L>  |  102  |  22  ----------------------------<  101<H>  4.9   |  20<L>  |  0.85    23 @ 10:01      129<L>  |  101  |  22  ----------------------------<  119<H>  5.5<H>   |  16<L>  |  0.82        Ca    8.2<L>      07 Mar 2023 05:22  Ca    7.5<L>      06 Mar 2023 06:44  Ca    8.3<L>      05 Mar 2023 10:01  Phos  6.1<H>     03-05  Mg     7.3<H>     03-05  Mg     7.5<H>     03-05  Mg     7.9<H>     03-05  Mg     6.8<H>     03-05    TPro  4.9<L>  /  Alb  2.6<L>  /  TBili  <0.1<L>  /  DBili  x   /  AST  20  /  ALT  25  /  AlkPhos  89  23 @ 05:22  TPro  5.2<L>  /  Alb  2.6<L>  /  TBili  <0.1<L>  /  DBili  x   /  AST  37  /  ALT  33  /  AlkPhos  102  23 @ 06:44  TPro  5.6<L>  /  Alb  2.7<L>  /  TBili  0.1<L>  /  DBili  x   /  AST  38  /  ALT  29  /  AlkPhos  121<H>  23 @ 10:01      Physical exam:  Gen: NAD  Abdomen: Soft, nontender, no distension , firm uterine fundus at umbilicus.  Incision: Clean, dry, and intact   Ext: No calf tenderness

## 2023-03-08 NOTE — PROGRESS NOTE ADULT - ASSESSMENT
A/P: 36yo with PNC c/b severe preeclampsia and FGR with elevated UAD POD#3 s/p pLTCS @30w1d for difficult to control BP's with postoperative course c/b hypertension not responsive to antihypertensives requiring monitoring in the SICU on POD#0 downgraded to the floor on POD#0. EBL 800ml. Patient is recovering appropriately with no sx of PEC. Antihypertensive regimen downgraded on POD#1. BP's uptrending to 150/80-90's overnight.     #sPEC  - HELLP labs reviewed       -- Cr now downtrendin.44->0.85->0.63  - P/C: 14.2  - s/p MgSO4 (3/5-3/6) for seizure ppx  - Con't Procardia 30XL BID + Labetalol 200mg TID , consider increasing labetalol dose       -- s/p Labetalol 20/40/80 prior to transfer       -- s/p Procardia 10/10 IR ()         -- s/p Lab 20 and Procardia 10 (3/4)       -- s/p hydral 5(3/5)  - Con't to monitor BPs     #Postoperative state  - COVID+ (3/3) asymptomatic  - Continue regular diet  - Increase ambulation  - Continue motrin, tylenol, oxycodone PRN for pain control  - BC: Jono Velásquez, PGY-3   MOM to return call to office A/P: 38yo with PNC c/b severe preeclampsia and FGR with elevated UAD POD#3 s/p pLTCS @30w1d for difficult to control BP's with postoperative course c/b hypertension not responsive to antihypertensives requiring monitoring in the SICU on POD#0 downgraded to the floor on POD#0. EBL 800ml. Patient is recovering appropriately with no sx of PEC. Antihypertensive regimen downgraded on POD#1. BP's uptrending to 150/80-90's overnight.     #sPEC  - HELLP labs reviewed       -- Cr now downtrendin.44->0.85->0.63  - P/C: 14.2  - s/p MgSO4 (3/5-3/6) for seizure ppx  - Con't Procardia 30XL BID + Labetalol 200mg TID , consider increasing labetalol dose       -- s/p Labetalol 20/40/80 prior to transfer       -- s/p Procardia 10/10 IR ()         -- s/p Lab 20 and Procardia 10 (3/4)       -- s/p hydral 5(3/5)  - f/u APLS labs   - Con't to monitor BPs     #Postoperative state  - COVID+ (3/3) asymptomatic  - Continue regular diet  - Increase ambulation  - Continue motrin, tylenol, oxycodone PRN for pain control  - BC: Jono Velásquez, PGY-3   A/P: 38yo with PNC c/b severe preeclampsia and FGR with elevated UAD POD#3 s/p pLTCS @30w1d for difficult to control BP's with postoperative course c/b hypertension not responsive to antihypertensives requiring monitoring in the SICU on POD#0 downgraded to the floor on POD#0. EBL 800ml. Patient is recovering appropriately with no sx of PEC. Antihypertensive regimen downgraded on POD#1. BP's uptrending to 150/80-90's overnight.     #sPEC  - HELLP labs reviewed       -- Cr now downtrendin.44->0.85->0.63  - P/C: 14.2  - s/p MgSO4 (3/5-3/6) for seizure ppx  - Con't Procardia 30XL BID + Labetalol 200mg TID , consider increasing labetalol dose       -- s/p Labetalol 20/40/80 prior to transfer       -- s/p Procardia 10/10 IR ()         -- s/p Lab 20 and Procardia 10 (3/4)       -- s/p hydral 5(3/5)  - f/u APLS labs   - Con't to monitor BPs     #Postoperative state  - COVID+ (3/3) asymptomatic  - Continue regular diet  - Increase ambulation  - Continue motrin, tylenol, oxycodone PRN for pain control  - BC: Jono Velásquez, PGY-3      ATTG:  Pt seen and evaluated  BP's over last 24 hrs: 129-140/81-87, with 2 outliers overnight (150/95 just prior to BP med and 151/87 one hour post meds)  Would consider D/C home on Procardia XL 30bid with Labetalol 200tid with BP check in clinic tomorrow, vs increasing evening dose of procardia to 60mg.  Will await MFM input re dispo

## 2023-03-09 ENCOUNTER — TRANSCRIPTION ENCOUNTER (OUTPATIENT)
Age: 38
End: 2023-03-09

## 2023-03-09 VITALS
RESPIRATION RATE: 18 BRPM | SYSTOLIC BLOOD PRESSURE: 120 MMHG | HEART RATE: 100 BPM | TEMPERATURE: 97 F | OXYGEN SATURATION: 98 % | DIASTOLIC BLOOD PRESSURE: 82 MMHG

## 2023-03-09 PROCEDURE — 97116 GAIT TRAINING THERAPY: CPT

## 2023-03-09 PROCEDURE — 87340 HEPATITIS B SURFACE AG IA: CPT

## 2023-03-09 PROCEDURE — 88307 TISSUE EXAM BY PATHOLOGIST: CPT

## 2023-03-09 PROCEDURE — 86762 RUBELLA ANTIBODY: CPT

## 2023-03-09 PROCEDURE — 85384 FIBRINOGEN ACTIVITY: CPT

## 2023-03-09 PROCEDURE — 86850 RBC ANTIBODY SCREEN: CPT

## 2023-03-09 PROCEDURE — 87635 SARS-COV-2 COVID-19 AMP PRB: CPT

## 2023-03-09 PROCEDURE — 97161 PT EVAL LOW COMPLEX 20 MIN: CPT

## 2023-03-09 PROCEDURE — 99239 HOSP IP/OBS DSCHRG MGMT >30: CPT

## 2023-03-09 PROCEDURE — 86780 TREPONEMA PALLIDUM: CPT

## 2023-03-09 PROCEDURE — 83615 LACTATE (LD) (LDH) ENZYME: CPT

## 2023-03-09 PROCEDURE — 85025 COMPLETE CBC W/AUTO DIFF WBC: CPT

## 2023-03-09 PROCEDURE — 86900 BLOOD TYPING SEROLOGIC ABO: CPT

## 2023-03-09 PROCEDURE — 59050 FETAL MONITOR W/REPORT: CPT

## 2023-03-09 PROCEDURE — 59025 FETAL NON-STRESS TEST: CPT

## 2023-03-09 PROCEDURE — 87653 STREP B DNA AMP PROBE: CPT

## 2023-03-09 PROCEDURE — 84100 ASSAY OF PHOSPHORUS: CPT

## 2023-03-09 PROCEDURE — 97165 OT EVAL LOW COMPLEX 30 MIN: CPT

## 2023-03-09 PROCEDURE — 84550 ASSAY OF BLOOD/URIC ACID: CPT

## 2023-03-09 PROCEDURE — 97530 THERAPEUTIC ACTIVITIES: CPT

## 2023-03-09 PROCEDURE — 83735 ASSAY OF MAGNESIUM: CPT

## 2023-03-09 PROCEDURE — 85730 THROMBOPLASTIN TIME PARTIAL: CPT

## 2023-03-09 PROCEDURE — 36415 COLL VENOUS BLD VENIPUNCTURE: CPT

## 2023-03-09 PROCEDURE — 80053 COMPREHEN METABOLIC PANEL: CPT

## 2023-03-09 PROCEDURE — 85610 PROTHROMBIN TIME: CPT

## 2023-03-09 PROCEDURE — 86901 BLOOD TYPING SEROLOGIC RH(D): CPT

## 2023-03-09 PROCEDURE — 86769 SARS-COV-2 COVID-19 ANTIBODY: CPT

## 2023-03-09 PROCEDURE — 85027 COMPLETE CBC AUTOMATED: CPT

## 2023-03-09 PROCEDURE — 76818 FETAL BIOPHYS PROFILE W/NST: CPT

## 2023-03-09 RX ORDER — LIDOCAINE HCL 20 MG/ML
10 VIAL (ML) INJECTION ONCE
Refills: 0 | Status: DISCONTINUED | OUTPATIENT
Start: 2023-03-09 | End: 2023-03-09

## 2023-03-09 RX ORDER — NIFEDIPINE 30 MG
1 TABLET, EXTENDED RELEASE 24 HR ORAL
Qty: 60 | Refills: 3
Start: 2023-03-09 | End: 2023-07-06

## 2023-03-09 RX ORDER — OXYCODONE HYDROCHLORIDE 5 MG/1
1 TABLET ORAL
Qty: 8 | Refills: 0
Start: 2023-03-09 | End: 2023-03-10

## 2023-03-09 RX ORDER — LABETALOL HCL 100 MG
1 TABLET ORAL
Qty: 60 | Refills: 3
Start: 2023-03-09 | End: 2023-07-06

## 2023-03-09 RX ORDER — IBUPROFEN 200 MG
1 TABLET ORAL
Qty: 0 | Refills: 0 | DISCHARGE
Start: 2023-03-09

## 2023-03-09 RX ORDER — LANOLIN
1 OINTMENT (GRAM) TOPICAL
Qty: 0 | Refills: 0 | DISCHARGE
Start: 2023-03-09

## 2023-03-09 RX ADMIN — HEPARIN SODIUM 5000 UNIT(S): 5000 INJECTION INTRAVENOUS; SUBCUTANEOUS at 05:22

## 2023-03-09 RX ADMIN — Medication 600 MILLIGRAM(S): at 12:23

## 2023-03-09 RX ADMIN — Medication 975 MILLIGRAM(S): at 04:00

## 2023-03-09 RX ADMIN — Medication 600 MILLIGRAM(S): at 05:21

## 2023-03-09 RX ADMIN — Medication 975 MILLIGRAM(S): at 16:22

## 2023-03-09 RX ADMIN — Medication 600 MILLIGRAM(S): at 01:30

## 2023-03-09 RX ADMIN — Medication 975 MILLIGRAM(S): at 09:09

## 2023-03-09 RX ADMIN — Medication 600 MILLIGRAM(S): at 11:53

## 2023-03-09 RX ADMIN — Medication 200 MILLIGRAM(S): at 18:05

## 2023-03-09 RX ADMIN — Medication 975 MILLIGRAM(S): at 03:13

## 2023-03-09 RX ADMIN — Medication 600 MILLIGRAM(S): at 00:33

## 2023-03-09 RX ADMIN — Medication 1 TABLET(S): at 11:52

## 2023-03-09 RX ADMIN — Medication 200 MILLIGRAM(S): at 05:21

## 2023-03-09 RX ADMIN — Medication 600 MILLIGRAM(S): at 18:05

## 2023-03-09 RX ADMIN — Medication 60 MILLIGRAM(S): at 09:09

## 2023-03-09 RX ADMIN — Medication 975 MILLIGRAM(S): at 09:39

## 2023-03-09 RX ADMIN — Medication 600 MILLIGRAM(S): at 06:56

## 2023-03-09 RX ADMIN — Medication 975 MILLIGRAM(S): at 15:52

## 2023-03-09 NOTE — PROGRESS NOTE ADULT - ATTENDING SUPERVISION STATEMENT
Resident
Fellow
Resident
Fellow
Fellow
Resident
Resident
Resident/Fellow
Resident/Fellow
Resident
Resident
Fellow

## 2023-03-09 NOTE — DISCHARGE NOTE OB - NS MD DC FALL RISK RISK
For information on Fall & Injury Prevention, visit: [email or web address] OR  [email or web address] OR  https://www.cdc.gov/steadi/patient.html

## 2023-03-09 NOTE — DISCHARGE NOTE OB - HOSPITAL COURSE
37 y.o.  initially presenting @28w6d as a transfer from [facility] due to elevated blood pressures meeting criteria for severe preeclampsia. She initially required Labetalol IVP 20/40/80mg. She was also started on Procardia 30XL for standing BP medications. Scan at [facility] demonstrated severe IUGR (945g, <1%) 37 y.o.  initially presenting @28w6d as a transfer from [facility] due to elevated blood pressures meeting criteria for severe preeclampsia. She initially required Labetalol IVP 20/40/80mg. She was also started on Procardia 30XL for standing BP medications. Scan at [facility] demonstrated severe IUGR (945g, <1%). She was given Magnesium for seizure prophylaxis and brain maturity (-). Patient's pressures were able to be controlled following this so she was admitted for further observation. Blood pressure medications continued to be titrated as needed and patient required up to Procardia 60/60XL + Labetalol 200 TID. She then also became COVID + in house (3/3). On 3/, she began to have severe range blood pressures requiring Procardia 10 IR initially. She then began to have more severe range blood pressures requiring Labetalol 20 IVP so decision was then made to proceed with delivery due to worsening severe pre-eclampsia. She had a pLTCS at 30w1d, . Labs as noted below, patient not symptomatic from anemia and APLS labs noted to be normal (was drawn due to worsening severe pre-eclampsia less than 32 weeks). She required more medications requiring Labetalol 40/80 IVP then Hydralazine 5 in the immediate postoperative period. Due to this SICU was consulted for further management of elevated blood pressures. She was accepted and was in SICU 3/5 then was transferred to the floor  3/6 due to improvement of blood pressures not requiring a drip. On POD#4 patient met all postoperative milestones with stable blood pressures and instructions to follow up in high risk clinic for BP and postoperative check. Nexplanon was also placed in POD#4 for birth control, no complications.

## 2023-03-09 NOTE — DISCHARGE NOTE OB - ADDITIONAL INSTRUCTIONS
Continúe tomándose la presión arterial al menos dos veces al día y tome skyler medicamentos según lo prescrito. Llame a adler obstetra si adler presión es superior a 150/100, o si experimenta aida de odette intensos o persistentes, cambios visuales, náuseas/vómitos persistentes, dificultad para respirar, dolor en el pecho o dolor abdominal intenso.    Después del patricia, permanezca en reposo pélvico diane 6 semanas, lo que significa que no tendrá relaciones sexuales, ni tampones ni duchas vaginales. No conduzca diane 2 semanas, ya que las mujeres pueden perder mucha tereso diane el parto y existe la posibilidad de marearse o desmayarse. No levante objetos más pesados que el bebé diane dos semanas. Espere tener sangrado/manchado vaginal por hasta seis semanas. El sangrado debería volverse más yayo y más bishop/marrón yayo con el tiempo. Para sangrar, empapar más de yolanda toalla sanitaria por hora o expulsar coágulos más grandes que el tamaño de adler puño, acuda al departamento de emergencias.    Seguimiento en la clínica para un control de la presión arterial la próxima semana. Seguimiento en 2 semanas para el control de la incisión. Llame a la clínica si nota un aumento notable del enrojecimiento o la hinchazón en la incisión, secreción de la incisión o abertura de la piel en el lugar de la incisión.    Mantenga el vendaje compresivo (vendaje elástico) diane 24 horas y manténgalo seco. Después de 24 horas, puede quitar el vendaje. • Mantenga el vendaje diane 3 a 5 días. • Es posible que tenga algo de hinchazón, moretones o decoloración por hasta dos semanas. • Trate de no golpear el lugar donde se colocó el Nexplanon (implante anticonceptivo en el brazo) diane unos días. Continúe tomándose la presión arterial al menos dos veces al día y tome skyler medicamentos según lo prescrito. Llame a adler obstetra si adler presión es superior a 150/100, o si experimenta aida de odette intensos o persistentes, cambios visuales, náuseas/vómitos persistentes, dificultad para respirar, dolor en el pecho o dolor abdominal intenso.    Después del patricia, permanezca en reposo pélvico diane 6 semanas, lo que significa que no tendrá relaciones sexuales, ni tampones ni duchas vaginales. No conduzca diane 2 semanas, ya que las mujeres pueden perder mucha tereso diane el parto y existe la posibilidad de marearse o desmayarse. No levante objetos más pesados que el bebé diane dos semanas. Espere tener sangrado/manchado vaginal por hasta seis semanas. El sangrado debería volverse más yayo y más bishop/marrón yayo con el tiempo. Para sangrar, empapar más de yolanda toalla sanitaria por hora o expulsar coágulos más grandes que el tamaño de adler puño, acuda al departamento de emergencias.    La clínica de christiano Scruggs va llamar usted por yolanda shakira para el control de la presión arterial la próxima semana. Seguimiento en 2 semanas para el control de la incisión. Llame a la clínica si nota un aumento notable del enrojecimiento o la hinchazón en la incisión, secreción de la incisión o abertura de la piel en el lugar de la incisión.    Mantenga el vendaje compresivo (vendaje elástico) diane 24 horas y manténgalo seco. Después de 24 horas, puede quitar el vendaje. • Mantenga el vendaje diane 3 a 5 días. • Es posible que tenga algo de hinchazón, moretones o decoloración por hasta dos semanas. • Trate de no golpear el lugar donde se colocó el Nexplanon (implante anticonceptivo en el brazo) diane unos días.

## 2023-03-09 NOTE — DISCHARGE NOTE OB - PLAN OF CARE
Patient presented with elevated blood pressures meeting criteria for severe pre-eclampsia. She was delivered via  due to fetal growth restriction and elevated UAD. On POD#4 patient's pain was well controlled and met all postoperative milestones. She was instructed to take her blood pressure at least twice a day, and take medications as prescribed. She was also instructed to call if /100, or if she experiences severe or persistent headaches, visual changes, persistent nausea/vomiting, trouble breathing, chest pain, or severe abdominal pain.

## 2023-03-09 NOTE — DISCHARGE NOTE OB - PATIENT PORTAL LINK FT
You can access the FollowMyHealth Patient Portal offered by [facility] by registering at the following website: [email or web address]. By joining [facility]’s FollowMyHealth portal, you will also be able to view your health information using other applications (apps) compatible with our system.

## 2023-03-09 NOTE — PROGRESS NOTE ADULT - ATTENDING COMMENTS
MFM Attending    MFM Attending  POD #2 s/p CD at 30 weeks due to severe preeclampsia, uncontrolled BPs, FGR, elevated umbilical artery Doppler  s/p SICU for BP control after delivery    COVID (+) 3/3 asymptomatic    MFM fellow note reviewed  Agree with A&P    Patient seen and evaluated by me with the MFM team  Patient doing well, states has some incisional pain with movement but does not endorse any other symptoms    -Routine post-op care  -Contnue  Procardia XL 60mg twice daily and Labetalol 300mg two times daily    Josette Sandoval MD, MPH
MFM Attending    Pt evaluated and counseled with the MFM team today.  Agree with the assessment and plan above.    In summary Ms. Parsons is a 36yo G1 @ 29 weeks HD 2 s/p transfer from [facility] for preeclampsia with severe features by blood pressures.  This pregnancy has also been complicated by IUGR, last 2nd percentile with elevated UA Dopplers.    The patient denies PEC sxs.  She endorses fetal movement.  BPs mostly 130-140/70-80s however occasional non-sustained elevations as high as the 160/100s.  HELLP labs this morning were normal.  The FHRT is reactive.      Plan:   -Will stop magnesium and continuous EFM  -Transfer to 3KN antepartum unit  -Will increase the nifedipine to 30mg XL BID  -For fetal testing with NSTs BID and BPP/Dopplers twice weekly  -HELLP labs q3 days with T&S  -Will attempt expectant management to 34 weeks unless a contraindication arises  -Currently for delivery via  for breech presentation, consented
MFM Attending  POD#3 s/p CD at 30 weeks due to severe preeclampsia-uncontrolled hypertension, FGR, elevated umbilical artery Doppler  s/p SICU admission after delivery for BP control    [place]   #[id number] ([place])     MFM fellow note reviewed  Agree with A&P    Patient seen and evaluated by me with the MFM team  Patient doing well, states she has incisional pain with movement which is improving    - Routine postoperative care   - Continue Procardia XL 60mg twice daily and Labetalol to 200mg twice daily  - Patient interested in Nexplanon, to be placed prior to discharge    Josette Sandoval MD, MPH
MFM Attending    I saw and evaluated Ms. Zaragoza at the bedside this morning with Dr. Lawrence, PGY-3. Overnight, the patient's blood pressure continued to rise, requiring several additional acute doses of antihypertensives. In context of worsening preeclampsia, delivery was indicated. The baby was delivered early this morning. Because the patient was essentially at maximum dosages of procardia and labetalol, and required hydralazine, SICU consultation was requested and the patient was transferred to the SICU. Laboratory studies are in an acceptable range. We will follow closely, along with the SICU team, whose consultation and care is much appreciated.
MFM Attending    I saw and evaluated Ms. Zaragoza at the bedside this morning. I agree with the assessment and plan as outlined in the note above by Dr. Hirschberg. At this time the risks of delivery exceed the benefits. Continue close maternal and fetal surveillance.
MFM Attending    I saw and evaluated Ms. Zaragoza at the bedside this morning. She is without complaints. Her blood pressures are trending up. To raise procardia XL to 60 mg twice a day. Continue close maternal and fetal surveillance.
MFM Attending    I saw and evaluated Ms. Zaragoza at the bedside this morning. She now has been diagnosed with COVID-19. She is asymptomatic. Blood pressures have generally improved, but need to be closely watched. BPP-10/10. On Doppler of the umbilical artery, the S/D ratio is elevated, but diastolic velocity is present in all waveforms. At this time, the benefit of continuing the pregnancy exceeds the benefit of delivery at this gestational age, with potentially significant risk to the fetus. Continue close maternal and fetal surveillance.
MFM Attending    Pt evaluated and counseled with the MFM team today with the use of a .  Agree with the assessment and plan above.    In summary Ms. Parsons is a 36yo G1 @ 29 1/7 weeks HD 3 s/p transfer from [facility] for preeclampsia with severe features by blood pressures.  This pregnancy has also been complicated by IUGR - 2nd percentile with elevated UA Dopplers.    She is s/p betamethasone now complete, s/p magnesium for seizure prophylaxis.  Did receive IVP antihypertensive medication on presentation to [facility].    The patient denies PEC sxs.  She endorses fetal movement.  BPs mostly 140/80s however occasional non-sustained elevations of systolic BP to the 150s.      Plan:   -Continue nifedipine 30mg q12, titrate as needed  -For fetal testing with NSTs BID and BPP/Dopplers twice weekly  -HELLP labs q3 days with T&S (due tomorrow)  -Will attempt expectant management to 34 weeks unless a contraindication arises  -Currently for delivery via  for breech presentation, consented
Pt seen w RN and  used    Patient doing well, no complaints, out of bed.   No HA, CP, SOB  No heavy vaginal bleeding (VB)/normal lochia    ICU Vital Signs Last 24 Hrs  T(C): 36.6 (07 Mar 2023 05:00), Max: 37.1 (06 Mar 2023 13:00)  HR: 78 (07 Mar 2023 05:00) (69 - 79)  BP: 132/79 (07 Mar 2023 05:00) (113/73 - 133/85)  RR: 18 (07 Mar 2023 05:00) (18 - 18)  SpO2: 97% (07 Mar 2023 05:00) (97% - 97%)    O2 Parameters below as of 07 Mar 2023 05:00  Patient On (Oxygen Delivery Method): room air                            7.4    18.02 )-----------( 191      ( 07 Mar 2023 05:22 )             22.3   03-07    139  |  108  |  12  ----------------------------<  75  4.2   |  22  |  0.63      Ca    8.2<L>      07 Mar 2023 05:22  Phos  6.1     03-05  Mg     7.3     03-05    TPro  4.9<L>  /  Alb  2.6<L>  /  TBili  <0.1<L>  /  DBili  x   /  AST  20  /  ALT  25  /  AlkPhos  89  03-07      NAD  abd: soft, appropriately tender  inc: clean, intact (Dermabond)  ext nontender    POD # 2 s/p C/S w PEC , delivered 30 wks  baby doing well in NICU  BPs controlled    note anemia on Fe  slow decrease I WBC but no sign of infection--will observe    Patient seen and evaluated by me. I agree with resident note unless otherwise stated.   Routine postpartum care, regular diet as tolerated, ambulate and pain control as needed.     CHEYANNE Orozco MD  Attending
MFM Attending  POD #1 s/p CD at 30 weeks due to severe preeclampsia, uncontrolled BPs, FGR, elevated umbilical artery Doppler  s/p SICU for BP control after delivery    COVID (+) 3/3 asymptomatic    MFM fellow note reviwed  Agree with A&P    Patient seen and evaluated by me with the MFM team  Patient doing well, tanesha has some incisional pain with movement but does not endorse any other symptoms    -Routine post-op care  -Contnue  Procardia XL 60mg twice daily and Labetalol 300my three times daily    Josette Sandoval MD, MPH
MFM Attending  POD#4 s/p CD at 30 weeks due to severe preeclampsia-uncontrolled BPs, FGR, elevated umbilical artery Doppler  s/p SICU admisssion sfter delivery for BP management    R3OB and MFM fellow notes reviewed  Agree with A&P.    Patient seen and evaluated by me with the MFM team  Patient doing well, does not endorse any new symptoms.    -Routine post-op care  -Continue Procardia XL 60 mg twice daily, Labetalol 200mg twice daily  - For Nexplanon placement, see procedure note   - Plan to titrate antihypertensives as needed outpatient, patient to take BP at home twice a day   - Discharge home today, return precautions given     Josette Sandoval MD, MPH
MFM Attending    I saw and evaluated Ms. Zaragoza at the bedside this morning. There is no apparent change in maternal or fetal condition NST is reactive today. Nifedipine has been increased to 60XL/30XL. Continue close maternal and fetal surveillance as noted in the note above by Dr. Alvarado.
MFM Attending    I saw and evaluated Ms. Zaragoza at the bedside this morning. I discussed her status and plan of care via a  on the language line. I agree with the assessment and plan as outlined by the resident in the note above. For BPP and Doppler studies today. Continue close maternal and fetal follow up.
MFM Attending    I saw and evaluated Ms. Moore at the bedside. I have read and agree with the assessment and plan as outlined in the note above by Dr. Durand. The patient is asymptomatic. Her blood pressures are increasing somewhat over the last few days. Although delivery is not indicated at this time, consider a rescue course of corticosteroids if the blood pressures continue to rise. Goal blood pressures should be in a range of 130-140's/80's-90. Labatelol was increased to 300 mg every 8 hours this morning.     NST reactive today.     Continue close maternal and fetal surveillance.

## 2023-03-09 NOTE — DISCHARGE NOTE OB - CARE PLAN
Principal Discharge DX:	Severe preeclampsia  Assessment and plan of treatment:	Patient presented with elevated blood pressures meeting criteria for severe pre-eclampsia. She was delivered via  due to fetal growth restriction and elevated UAD. On POD#4 patient's pain was well controlled and met all postoperative milestones. She was instructed to take her blood pressure at least twice a day, and take medications as prescribed. She was also instructed to call if /100, or if she experiences severe or persistent headaches, visual changes, persistent nausea/vomiting, trouble breathing, chest pain, or severe abdominal pain.   1

## 2023-03-09 NOTE — PROGRESS NOTE ADULT - SUBJECTIVE AND OBJECTIVE BOX
OB Postpartum Note:  Delivery, POD#4    :    S: 36yo POD#4 s/p LTCS. The patient feels well. Pain is well controlled. She is tolerating a regular diet and has had BM. She is voiding spontaneously, and ambulating without difficulty. Denies CP/SOB. Denies lightheadedness/dizziness. Denies N/V. Denies headache or vision changes.     O:  Vitals:  Vital Signs Last 24 Hrs  T(C): 36.8 (09 Mar 2023 00:36), Max: 36.9 (08 Mar 2023 09:00)  T(F): 98.2 (09 Mar 2023 00:36), Max: 98.5 (08 Mar 2023 09:00)  HR: 82 (09 Mar 2023 00:36) (74 - 88)  BP: 147/86 (09 Mar 2023 00:36) (124/81 - 154/99)  BP(mean): --  RR: 20 (09 Mar 2023 00:36) (18 - 20)  SpO2: 97% (09 Mar 2023 00:36) (97% - 99%)    Parameters below as of 09 Mar 2023 00:36  Patient On (Oxygen Delivery Method): room air        MEDICATIONS  (STANDING):  acetaminophen     Tablet .. 975 milliGRAM(s) Oral <User Schedule>  diphtheria/tetanus/pertussis (acellular) Vaccine (Adacel) 0.5 milliLiter(s) IntraMuscular once  heparin   Injectable 5000 Unit(s) SubCutaneous every 12 hours  ibuprofen  Tablet. 600 milliGRAM(s) Oral every 6 hours  labetalol 200 milliGRAM(s) Oral every 12 hours  lactated ringers. 1000 milliLiter(s) (125 mL/Hr) IV Continuous <Continuous>  NIFEdipine XL 60 milliGRAM(s) Oral every 12 hours  oxytocin Infusion 333.333 milliUNIT(s)/Min (1000 mL/Hr) IV Continuous <Continuous>  polyethylene glycol 3350 17 Gram(s) Oral at bedtime  prenatal multivitamin 1 Tablet(s) Oral daily  senna 2 Tablet(s) Oral at bedtime    MEDICATIONS  (PRN):  dexAMETHasone  Injectable 4 milliGRAM(s) IV Push every 6 hours PRN Nausea  diphenhydrAMINE 25 milliGRAM(s) Oral every 6 hours PRN Pruritus  lanolin Ointment 1 Application(s) Topical every 6 hours PRN Sore Nipples  magnesium hydroxide Suspension 30 milliLiter(s) Oral two times a day PRN Constipation  ondansetron Injectable 4 milliGRAM(s) IV Push every 6 hours PRN Nausea  oxyCODONE    IR 5 milliGRAM(s) Oral every 3 hours PRN Moderate to Severe Pain (4-10)  simethicone 80 milliGRAM(s) Chew every 4 hours PRN Gas      LABS:  Blood type: O Positive  Rubella IgG: RPR: Negative                          7.4<L>   18.02<H> >-----------< 191    (  @ 05:22 )             22.3<L>                        8.0<L>   19.52<H> >-----------< 183    (  @ 06:44 )             24.3<L>    23 @ 05:22      139  |  108  |  12  ----------------------------<  75  4.2   |  22  |  0.63    23 @ 06:44      132<L>  |  102  |  22  ----------------------------<  101<H>  4.9   |  20<L>  |  0.85        Ca    8.2<L>      07 Mar 2023 05:22  Ca    7.5<L>      06 Mar 2023 06:44    TPro  4.9<L>  /  Alb  2.6<L>  /  TBili  <0.1<L>  /  DBili  x   /  AST  20  /  ALT  25  /  AlkPhos  89  23 @ 05:22  TPro  5.2<L>  /  Alb  2.6<L>  /  TBili  <0.1<L>  /  DBili  x   /  AST  37  /  ALT  33  /  AlkPhos  102  23 @ 06:44        Physical exam:  Gen: NAD  Abdomen: Soft, nontender, no distension , firm uterine fundus  Incision: Clean, dry, and intact   Ext: No calf tenderness, bilateral edema present             OB Postpartum Note:  Delivery, POD#4    : [id number]    S: 38yo POD#4 s/p LTCS. The patient feels well. Pain is well controlled. She is tolerating a regular diet and has had BM. She is voiding spontaneously, and ambulating without difficulty. Denies CP/SOB. Denies lightheadedness/dizziness. Denies N/V. Denies headache or vision changes.     O:  Vitals:  Vital Signs Last 24 Hrs  T(C): 36.8 (09 Mar 2023 00:36), Max: 36.9 (08 Mar 2023 09:00)  T(F): 98.2 (09 Mar 2023 00:36), Max: 98.5 (08 Mar 2023 09:00)  HR: 82 (09 Mar 2023 00:36) (74 - 88)  BP: 147/86 (09 Mar 2023 00:36) (124/81 - 154/99)  BP(mean): --  RR: 20 (09 Mar 2023 00:36) (18 - 20)  SpO2: 97% (09 Mar 2023 00:36) (97% - 99%)    Parameters below as of 09 Mar 2023 00:36  Patient On (Oxygen Delivery Method): room air        MEDICATIONS  (STANDING):  acetaminophen     Tablet .. 975 milliGRAM(s) Oral <User Schedule>  diphtheria/tetanus/pertussis (acellular) Vaccine (Adacel) 0.5 milliLiter(s) IntraMuscular once  heparin   Injectable 5000 Unit(s) SubCutaneous every 12 hours  ibuprofen  Tablet. 600 milliGRAM(s) Oral every 6 hours  labetalol 200 milliGRAM(s) Oral every 12 hours  lactated ringers. 1000 milliLiter(s) (125 mL/Hr) IV Continuous <Continuous>  NIFEdipine XL 60 milliGRAM(s) Oral every 12 hours  oxytocin Infusion 333.333 milliUNIT(s)/Min (1000 mL/Hr) IV Continuous <Continuous>  polyethylene glycol 3350 17 Gram(s) Oral at bedtime  prenatal multivitamin 1 Tablet(s) Oral daily  senna 2 Tablet(s) Oral at bedtime    MEDICATIONS  (PRN):  dexAMETHasone  Injectable 4 milliGRAM(s) IV Push every 6 hours PRN Nausea  diphenhydrAMINE 25 milliGRAM(s) Oral every 6 hours PRN Pruritus  lanolin Ointment 1 Application(s) Topical every 6 hours PRN Sore Nipples  magnesium hydroxide Suspension 30 milliLiter(s) Oral two times a day PRN Constipation  ondansetron Injectable 4 milliGRAM(s) IV Push every 6 hours PRN Nausea  oxyCODONE    IR 5 milliGRAM(s) Oral every 3 hours PRN Moderate to Severe Pain (4-10)  simethicone 80 milliGRAM(s) Chew every 4 hours PRN Gas      LABS:  Blood type: O Positive  Rubella IgG: RPR: Negative                          7.4<L>   18.02<H> >-----------< 191    (  @ 05:22 )             22.3<L>                        8.0<L>   19.52<H> >-----------< 183    (  @ 06:44 )             24.3<L>    23 @ 05:22      139  |  108  |  12  ----------------------------<  75  4.2   |  22  |  0.63    23 @ 06:44      132<L>  |  102  |  22  ----------------------------<  101<H>  4.9   |  20<L>  |  0.85        Ca    8.2<L>      07 Mar 2023 05:22  Ca    7.5<L>      06 Mar 2023 06:44    TPro  4.9<L>  /  Alb  2.6<L>  /  TBili  <0.1<L>  /  DBili  x   /  AST  20  /  ALT  25  /  AlkPhos  89  23 @ 05:22  TPro  5.2<L>  /  Alb  2.6<L>  /  TBili  <0.1<L>  /  DBili  x   /  AST  37  /  ALT  33  /  AlkPhos  102  23 @ 06:44        Physical exam:  Gen: NAD  Abdomen: Soft, nontender, no distension , firm uterine fundus  Incision: Clean, dry, and intact   Ext: No calf tenderness, bilateral edema present

## 2023-03-09 NOTE — DISCHARGE NOTE OB - PROVIDER TOKENS
FREE:[LAST:[[facility]],PHONE:[phone]],FAX:[(   )    -],ADDRESS:[[address] [place] [address] [address] [place] [address]],FOLLOWUP:[1 week]]

## 2023-03-09 NOTE — DISCHARGE NOTE OB - MEDICATION SUMMARY - MEDICATIONS TO TAKE
I will START or STAY ON the medications listed below when I get home from the hospital:    blood pressure cuff   -- 1 applicator transdermally 3 times a day   -- Indication: For check blood pressure    ibuprofen 600 mg oral tablet  -- 1 tab(s) by mouth every 6 hours  -- Indication: For pain    oxyCODONE 5 mg oral tablet  -- 1 tab(s) by mouth every 6 hours MDD:4 tablets  -- Caution federal law prohibits the transfer of this drug to any person other  than the person for whom it was prescribed.  It is very important that you take or use this exactly as directed.  Do not skip doses or discontinue unless directed by your doctor.  May cause drowsiness or dizziness.  This prescription cannot be refilled.  Using more of this medication than prescribed may cause serious breathing problems.    -- Indication: For Severe pain    labetalol 200 mg oral tablet  -- 1 tab(s) by mouth every 12 hours, do not take if BP < 110/60  -- It is very important that you take or use this exactly as directed.  Do not skip doses or discontinue unless directed by your doctor.  May cause drowsiness or dizziness.  Some non-prescription drugs may aggravate your condition.  Read all labels carefully.  If a warning appears, check with your doctor before taking.    -- Indication: For blood pressure    Procardia XL 60 mg oral tablet, extended release  -- 1 tab(s) by mouth every 12 hours, do not take if BP < 110/60  -- Avoid grapefruit and grapefruit juice while taking this medication.  It is very important that you take or use this exactly as directed.  Do not skip doses or discontinue unless directed by your doctor.  Some non-prescription drugs may aggravate your condition.  Read all labels carefully.  If a warning appears, check with your doctor before taking.  Swallow whole.  Do not crush.    -- Indication: For blood pressure    lanolin topical ointment  -- 1 application on skin every 6 hours, As needed, Sore Nipples  -- Indication: For Sore nipples    Prenatal Multivitamins with Folic Acid 1 mg oral tablet  -- 1 tab(s) by mouth once a day  -- Indication: For multivitamin

## 2023-03-09 NOTE — PROGRESS NOTE ADULT - ASSESSMENT
A/P: 36yo with PNC c/b severe preeclampsia and FGR with elevated UAD POD#4 s/p pLTCS @30w1d for difficult to control BP's with postoperative course c/b hypertension not responsive to antihypertensives requiring monitoring in the SICU on POD#0 downgraded to the floor on POD#0. EBL 800ml. Patient is recovering appropriately with no sx of PEC. Antihypertensive regimen downgraded on POD#1. BP's mild range yesterday and overnight.,     #sPEC  - HELLP labs reviewed, wnl  - P/C: 14.2  - s/p MgSO4 (3/5-3/6) for seizure ppx  - Con't Procardia 30XL BID + Labetalol 200mg TIDe       -- s/p Labetalol 20/40/80 prior to transfer       -- s/p Procardia 10/10 IR (2/28)         -- s/p Lab 20 and Procardia 10 (3/4)       -- s/p hydral 5(3/5)  - Lupus anticoagulant negative, f/u other APLS labs  - Con't to monitor BPs     #Postoperative state  - COVID+ (3/3) asymptomatic  - Continue regular diet  - Increase ambulation  - Continue motrin, tylenol, oxycodone PRN for pain control  - BC: Nexplanon prior to discharge    Dispo: Discharge planning    Jackie Velásquez, PGY-3 A/P: 36yo with PNC c/b severe preeclampsia and FGR with elevated UAD POD#4 s/p pLTCS @30w1d for difficult to control BP's with postoperative course c/b hypertension not responsive to antihypertensives requiring monitoring in the SICU on POD#0 downgraded to the floor on POD#0. EBL 800ml. Patient is recovering appropriately with no sx of PEC. Antihypertensive regimen downgraded on POD#1. BP's mild range yesterday and overnight.,     #sPEC  - HELLP labs reviewed, wnl  - P/C: 14.2  - s/p MgSO4 (3/5-3/6) for seizure ppx  - Con't Procardia 30XL BID + Labetalol 200mg TIDe       -- s/p Labetalol 20/40/80 prior to transfer       -- s/p Procardia 10/10 IR ()         -- s/p Lab 20 and Procardia 10 (3/4)       -- s/p hydral 5(3/5)  - Lupus anticoagulant negative, f/u other APLS labs  - Con't to monitor BPs     #Postoperative state  - COVID+ (3/3) asymptomatic  - Continue regular diet  - Increase ambulation  - Continue motrin, tylenol, oxycodone PRN for pain control  - BC: Nexplanon prior to discharge    Dispo: Discharge planning    Jackie Velásquez, PGY-3    36yo  now POD#3 s/p pLTCS @30w1d for severe PEC and uncontrolled BPs. Today she is feeling overall well with no headache, vision changes or RUQ pain. She denies lightheadedness or dizziness. Abdominal exam with well healing incision and appropriately tender. Patient is progressing appropriately postoperatively. Blood pressures are stable on procardia 60/60XL and labetalol to 200BID. Plan for discharge home today.   - s/p Nexplanon placement, see procedure note   - Plan to titrate antihypertensives as needed outpatient, patient to take BP at home twice a day   - Discharge home today, return precautions given     Carly Hirschberg, MFM Fellow   Patient seen and examined with CAROLYN Martinez Attending

## 2023-03-09 NOTE — PROCEDURE NOTE - ADDITIONAL PROCEDURE DETAILS
Nexplanon Insertion Procedure Note    Risks & benefits of procedure discussed with patient. Site for nexplanon insertion marked on pts left arm 8cm proximal to the medical epicondyle. Site was prepped with chloraprep solution. Sterile technique used. 2-3cc lidocaine w/o epinephrine injected subcutaneously. Nexplanon device needle placed under the skin to the depth of the bevel at a 30 degree angle. Angle was then decreased and device was further inserted under the skin until full metal  was under skin. Nexplanon device deployed. Minor bleeding noted at insertion site. Gauze & bandage placed over site. Pt tolerated the procedure well.     w/ Dr. Jaime VELAZQUEZM attending +  Carly Hirschberg MFM fellow   Donnell Lawrence PGY-3 Nexplanon Insertion Procedure Note    Risks & benefits of procedure discussed with patient. Site for nexplanon insertion marked on pts left arm 8cm proximal to the medical epicondyle. Site was prepped with chloraprep solution. Sterile technique used. 2-3cc lidocaine w/o epinephrine injected subcutaneously. Nexplanon device needle placed under the skin to the depth of the bevel at a 30 degree angle. Angle was then decreased and device was further inserted under the skin until full metal  was under skin. Nexplanon device deployed. Minor bleeding noted at insertion site. Gauze & bandage placed over site. Pt tolerated the procedure well.     Lot#: [id number]    w/ Dr. Jaime LEON attending +  Carly Hirschberg MFM fellow   Donnell Lawrence PGY-3

## 2023-03-10 ENCOUNTER — NON-APPOINTMENT (OUTPATIENT)
Age: 38
End: 2023-03-10

## 2023-03-14 ENCOUNTER — APPOINTMENT (OUTPATIENT)
Dept: MATERNAL FETAL MEDICINE | Facility: CLINIC | Age: 38
End: 2023-03-14
Payer: MEDICAID

## 2023-03-14 ENCOUNTER — OUTPATIENT (OUTPATIENT)
Dept: OUTPATIENT SERVICES | Facility: HOSPITAL | Age: 38
LOS: 1 days | End: 2023-03-14
Payer: MEDICAID

## 2023-03-14 VITALS
OXYGEN SATURATION: 98 % | DIASTOLIC BLOOD PRESSURE: 79 MMHG | TEMPERATURE: 97.9 F | HEART RATE: 89 BPM | SYSTOLIC BLOOD PRESSURE: 126 MMHG

## 2023-03-14 DIAGNOSIS — O09.899 SUPERVISION OF OTHER HIGH RISK PREGNANCIES, UNSPECIFIED TRIMESTER: ICD-10-CM

## 2023-03-14 PROCEDURE — G0463: CPT

## 2023-03-14 PROCEDURE — 99212 OFFICE O/P EST SF 10 MIN: CPT | Mod: GC

## 2023-03-19 NOTE — CONSULT LETTER
[FreeTextEntry1] : MFM Fellow Addendum\par \par 38 YO  POD9 (3/5/23) from [facility] for  preeclampsia with severe features with difficult to control blood pressures. Patient is currently on procardia 60 mg BID and labetalol 200 mg BID. /79. On home blood pressure monitoring SBPs 120-130s. Blood pressures well controlled - no need for medication titration at this time. Patient reports she has intermittent headaches that always resolve Tylenol and amoxicillin. Patient had amoxicillin at home from previous infection, and we reviewed that this should not be used for headache. Patient will use Tylenol and/or motrin going forward. Discussed long-term health implications of preeclampsia and risk of recurrence in future pregnancies. Will refer to cardio obstetrics. Nexplanon in place. Return to care in 2 weeks.\par \par Amna Lane MD\par D/w Dr. March and Dr. Thompson

## 2023-03-19 NOTE — HISTORY OF PRESENT ILLNESS
[3/10] : the patient reports pain that is 3/10 in severity [Clean/Dry/Intact] : clean, dry and intact [Healed] : healed [Doing Well] : is doing well [No Sign of Infection] : is showing no signs of infection [Fever] : no fever [Chills] : no chills [Nausea] : no nausea [Vomiting] : no vomiting [Diarrhea] : no diarrhea [Vaginal Bleeding] : no vaginal bleeding [Pelvic Pressure] : no pelvic pressure [Dysuria] : no dysuria [Vaginal Discharge] : no vaginal discharge [Constipation] : no constipation [de-identified] : 38yo s/p pLTCS for sPEC @30wks+1d  on 3/5 presents for BP monitoring/wound check. Patient reports intermittent HA that improves with tylenol and amoxicillin .  [de-identified] : prineo removed [de-identified] : 38yo POD#9 s/p pLTCS for sPEC @30wks+1d  on 3/5. Doing well.\par -BPs at home 120s-130s (taking BPs BID). PEC precautions reviewed. counseled patient to stop taking antibiotics without a diagnosis of infection) \par -taking procardia 60BID, and lab 200BID\par -Cardio OB t be emailed for follow up appointment\par -Nexplanon for birth control

## 2023-03-20 DIAGNOSIS — O14.13 SEVERE PRE-ECLAMPSIA, THIRD TRIMESTER: ICD-10-CM

## 2023-03-20 DIAGNOSIS — Z98.891 HISTORY OF UTERINE SCAR FROM PREVIOUS SURGERY: ICD-10-CM

## 2023-03-23 ENCOUNTER — NON-APPOINTMENT (OUTPATIENT)
Age: 38
End: 2023-03-23

## 2023-03-28 ENCOUNTER — OUTPATIENT (OUTPATIENT)
Dept: OUTPATIENT SERVICES | Facility: HOSPITAL | Age: 38
LOS: 1 days | End: 2023-03-28
Payer: MEDICAID

## 2023-03-28 ENCOUNTER — APPOINTMENT (OUTPATIENT)
Dept: MATERNAL FETAL MEDICINE | Facility: CLINIC | Age: 38
End: 2023-03-28
Payer: MEDICAID

## 2023-03-28 VITALS
OXYGEN SATURATION: 98 % | WEIGHT: 122 LBS | HEART RATE: 88 BPM | DIASTOLIC BLOOD PRESSURE: 66 MMHG | SYSTOLIC BLOOD PRESSURE: 102 MMHG | TEMPERATURE: 98.2 F | BODY MASS INDEX: 23.95 KG/M2 | HEIGHT: 60 IN

## 2023-03-28 DIAGNOSIS — Z98.891 HISTORY OF UTERINE SCAR FROM PREVIOUS SURGERY: ICD-10-CM

## 2023-03-28 DIAGNOSIS — Z01.30 ENCOUNTER FOR EXAMINATION OF BLOOD PRESSURE WITHOUT ABNORMAL FINDINGS: ICD-10-CM

## 2023-03-28 DIAGNOSIS — O09.899 SUPERVISION OF OTHER HIGH RISK PREGNANCIES, UNSPECIFIED TRIMESTER: ICD-10-CM

## 2023-03-28 LAB
BILIRUB UR QL STRIP: NORMAL
COLLECTION METHOD: NORMAL
GLUCOSE UR-MCNC: NORMAL
HCG UR QL: 0.2 EU/DL
HGB UR QL STRIP.AUTO: NORMAL
KETONES UR-MCNC: NORMAL
LEUKOCYTE ESTERASE UR QL STRIP: NORMAL
NITRITE UR QL STRIP: NORMAL
PH UR STRIP: 7
PROT UR STRIP-MCNC: NORMAL
SP GR UR STRIP: 1.01

## 2023-03-28 PROCEDURE — G0463: CPT

## 2023-03-28 PROCEDURE — 99212 OFFICE O/P EST SF 10 MIN: CPT

## 2023-04-01 NOTE — HISTORY OF PRESENT ILLNESS
[Delivery Date: ___] : on [unfilled] [Primary C/S] : delivered by  section [Male] : Delivery History: baby boy [Wt. ___] : weighing [unfilled] [NICU: ___] : NICU: [unfilled] [Clean/Dry/Intact] : clean, dry and intact [Doing Well] : is doing well [No Sign of Infection] : is showing no signs of infection [Excellent Pain Control] : has excellent pain control [BTL] : no tubal ligation [Erythema] : not erythematous [Swelling] : not swollen [Dehiscence] : not dehisced [FreeTextEntry8] : Pt presents for 2nd PP B/P check, 3 wk s/p primary LTC/S on 3/5/23 for sPEC at 30w1d.   [FreeTextEntry9] : Had PNC in [place] - she can't remember the name of the doctor. [de-identified] : D/c'd on Procardia 60 mg BID and Labetalol 200 mg BID.  Nexplanon was placed PP [de-identified] : Denies h/a, n/v, upper abd pain, visual disturbances.  Has mild VB. Breastfeeding exclusively w/no complications.  C/o mild incisional pain but denies wound separation, drainage, bleeding, erythema, edema. Denies fever/chills.  Checking B/Ps at home, reportedly 117 - 120/60s - 70s.  Reports compliance w/B/P meds.  [de-identified] : B/P 102/66  122lb  Hr 88  pO2 98%  Temp 98.2F [de-identified] : 38 yo , 3 wk s/p C/S at 30 wk for severe PEC.  Asymptomatic, w/low B/Ps on Procardia and Labetalol.  Wound is healing well. [de-identified] : D/C Procardia.  Continue Labetalol 200 mg BID.  Continue home B/P monitoring and keep 4/6 cards appt.  Gave pt the option of last PP visit here or w/primary OB; she chooses to have it here.  Visit is scheduled for 4/11.  ERNST Felix   Evaluated and A/P made w/Dr. Lane & Dr. Godoy [FreeTextEntry1] : MFM Fellow Addendum 3/28/23\par \par 36 YO  POD23 (3/5/23) from [facility] for  preeclampsia with severe features with difficult to control blood pressures. Patient is currently on procardia 60 mg BID and labetalol 200 mg BID. /66. On home blood pressure monitoring SBPs 117-120/60-70s. Will stop Procardia and continue labetalol 200 mg BID. Cardio obstetrics 23. Has PCP. Nexplanon in place. Return to care 23.\par \par Amna Lane MD\par D/w SARA Hawley and Dr. Godoy

## 2023-04-02 LAB — SURGICAL PATHOLOGY STUDY: SIGNIFICANT CHANGE UP

## 2023-04-06 ENCOUNTER — APPOINTMENT (OUTPATIENT)
Dept: CARDIOLOGY | Facility: CLINIC | Age: 38
End: 2023-04-06
Payer: MEDICAID

## 2023-04-06 ENCOUNTER — NON-APPOINTMENT (OUTPATIENT)
Age: 38
End: 2023-04-06

## 2023-04-06 VITALS
HEIGHT: 60 IN | DIASTOLIC BLOOD PRESSURE: 70 MMHG | TEMPERATURE: 97.1 F | HEART RATE: 85 BPM | SYSTOLIC BLOOD PRESSURE: 120 MMHG | OXYGEN SATURATION: 99 % | WEIGHT: 120 LBS | BODY MASS INDEX: 23.56 KG/M2

## 2023-04-06 DIAGNOSIS — Z78.9 OTHER SPECIFIED HEALTH STATUS: ICD-10-CM

## 2023-04-06 DIAGNOSIS — I10 ESSENTIAL (PRIMARY) HYPERTENSION: ICD-10-CM

## 2023-04-06 PROCEDURE — 93000 ELECTROCARDIOGRAM COMPLETE: CPT

## 2023-04-06 PROCEDURE — 99203 OFFICE O/P NEW LOW 30 MIN: CPT | Mod: 25

## 2023-04-06 NOTE — HISTORY OF PRESENT ILLNESS
[FreeTextEntry1] : 38 y/o F, , s/p pCS on 3/5/23 at 30w1d due to severe PEC. Patient required SICU admission after delivery due to uncontrolled BP. Was discharged on labetalol 200mg bid and procardia xl 60mg daily. Procardia was dc'ed about 10 days ago, she remains on labetalol 200mg bid. She monitors ambulatory BP and reports it is well conrolled. She denies any significant PMH prior to pregnancy. She had a HA a few days ago, which improved with tylenol. She denies blurry vision, CP, epigastric pain, dyspnea, palpitations, dizziness, lightheadedness, syncope or near syncope, LE edema. \par Family Hx: denies premature CAD or sudden cardiac death \par Social Hx: denies smoking, ETOH, or prior illciit drug use

## 2023-04-06 NOTE — REASON FOR VISIT
[Other: ____] : [unfilled] [[employer] ] : provided by [employer]   [Interpreters_IDNumber] : [id number]

## 2023-04-06 NOTE — DISCUSSION/SUMMARY
[FreeTextEntry1] : 36 y/o F, , s/p pCS on 3/5/23 at 30w1d due to severe PEC. Patient required SICU admission after delivery due to uncontrolled BP. Was discharged on labetalol 200mg bid and procardia xl 60mg daily. \par \par #PEC\par BP currently controlled \par Decrease labetalol to 100mg bid \par Continue ambulatory BP monitoring \par Counseled on increased risk of PEC with future pregnancies and overall increased risk of cHTN and CVD. Advised to follow a heart healthy diet and aim for 150 mins of moderate intensity exercise/week\par RTC 2 weeks for BP check and further adjustment as tolerated  [EKG obtained to assist in diagnosis and management of assessed problem(s)] : EKG obtained to assist in diagnosis and management of assessed problem(s)

## 2023-04-11 ENCOUNTER — APPOINTMENT (OUTPATIENT)
Dept: MATERNAL FETAL MEDICINE | Facility: CLINIC | Age: 38
End: 2023-04-11

## 2023-05-04 ENCOUNTER — APPOINTMENT (OUTPATIENT)
Dept: CARDIOLOGY | Facility: CLINIC | Age: 38
End: 2023-05-04
Payer: MEDICAID

## 2023-05-04 VITALS
HEIGHT: 60 IN | HEART RATE: 78 BPM | DIASTOLIC BLOOD PRESSURE: 74 MMHG | TEMPERATURE: 98.3 F | OXYGEN SATURATION: 99 % | WEIGHT: 121 LBS | SYSTOLIC BLOOD PRESSURE: 116 MMHG | BODY MASS INDEX: 23.75 KG/M2

## 2023-05-04 PROCEDURE — 93000 ELECTROCARDIOGRAM COMPLETE: CPT

## 2023-05-04 PROCEDURE — 99213 OFFICE O/P EST LOW 20 MIN: CPT | Mod: 25

## 2023-05-04 NOTE — DISCUSSION/SUMMARY
[FreeTextEntry3] : PRN [FreeTextEntry1] : 36 y/o F, , s/p pCS on 3/5/23 at 30w1d due to severe PEC. Patient required SICU admission after delivery due to uncontrolled BP. Was discharged on labetalol 200mg bid and procardia xl 60mg daily. \par \par #PEC\par BP currently controlled \par Dc labetalol \par Continue ambulatory BP monitoring off antiHTN\par Counseled on increased risk of PEC with future pregnancies and overall increased risk of cHTN and CVD. Advised to follow a heart healthy diet and aim for 150 mins of moderate intensity exercise/week\par Check urine protein/Cr ratio to ensure resolution \par Establish care with PCP  [EKG obtained to assist in diagnosis and management of assessed problem(s)] : EKG obtained to assist in diagnosis and management of assessed problem(s)

## 2023-05-04 NOTE — HISTORY OF PRESENT ILLNESS
[FreeTextEntry1] : 38 y/o F, , s/p pCS on 3/5/23 at 30w1d due to severe PEC. Patient required SICU admission after delivery due to uncontrolled BP. Was discharged on labetalol 200mg bid and procardia xl 60mg daily. Procardia was dc'ed about 10 days ago, she remains on labetalol 200mg bid. She monitors ambulatory BP and reports it is well conrolled. She denies any significant PMH prior to pregnancy. She had a HA a few days ago, which improved with tylenol. \par Family Hx: denies premature CAD or sudden cardiac death \par Social Hx: denies smoking, ETOH, or prior illciit drug use \par \par 23:\par Patient was last seen by me 23\par Labetalol was decreased to 100mg bid\par She monitors her ambulatory BP, which she reports has been well controlled \par Currently feels well with no current complaints \par She denies blurry vision, CP, epigastric pain, dyspnea, palpitations, dizziness, lightheadedness, syncope or near syncope, LE edema.

## 2023-05-04 NOTE — REASON FOR VISIT
[Other: ____] : [unfilled] [[employer] ] : provided by [employer]   [Interpreters_IDNumber] : [id number] [TWNoteComboBox1] : Beninese

## 2023-05-10 LAB
CREAT SPEC-SCNC: 25 MG/DL
CREAT/PROT UR: 1.6 RATIO
PROT UR-MCNC: 40 MG/DL

## 2023-05-18 ENCOUNTER — APPOINTMENT (OUTPATIENT)
Dept: NEPHROLOGY | Facility: CLINIC | Age: 38
End: 2023-05-18
Payer: MEDICAID

## 2023-05-18 VITALS
BODY MASS INDEX: 23.56 KG/M2 | SYSTOLIC BLOOD PRESSURE: 118 MMHG | HEIGHT: 60 IN | WEIGHT: 120 LBS | HEART RATE: 72 BPM | OXYGEN SATURATION: 99 % | DIASTOLIC BLOOD PRESSURE: 68 MMHG

## 2023-05-18 DIAGNOSIS — R80.9 PROTEINURIA, UNSPECIFIED: ICD-10-CM

## 2023-05-18 PROCEDURE — 99205 OFFICE O/P NEW HI 60 MIN: CPT

## 2023-05-18 RX ORDER — LABETALOL HYDROCHLORIDE 100 MG/1
100 TABLET, FILM COATED ORAL
Qty: 60 | Refills: 1 | Status: DISCONTINUED | COMMUNITY
End: 2023-05-18

## 2023-05-18 NOTE — REASON FOR VISIT
[Initial Evaluation] : an initial evaluation [Interpreters_IDNumber] : [id number] [Interpreters_FullName] : Emani [TWNoteComboBox1] : Surinamese

## 2023-05-18 NOTE — ASSESSMENT
[FreeTextEntry1] : h/o PEC s.p emergency \par HTN\par BP now 118/68\par UA bland; Tp/cr ratio ~ 1.6 gm, improved from 14.2 gram in February\par \par Studies have shown that HTN and proteinuria may persist upto as long as 2 years.\par Our pt is 2.5 months post delivery with improving proteinuria and BP- i will not classify her as chronic HTN at this time, nor work up for underlying renal disease\par I will repeat UA, tp.cr ratio in one month to follow trend\par continue Labetalol 100 mg bid for now; Also instructed pt to keep BP log at home whenever possible.\par RTC in 1 month

## 2023-05-18 NOTE — HISTORY OF PRESENT ILLNESS
[FreeTextEntry1] : 38 y/o F, , s/p emergency C- section on 3/5/23 at 30w1d due to severe PEC. Patient required SICU admission after delivery due to uncontrolled BP. Was discharged on labetalol 200mg bid and procardia xl 60mg daily. Procardia was later dc'ed , she remained on labetalol 200 mg bid until recent cardiology appt when dosage was decreased to 100 mg bid.\par Pt now sent to us for evaluation of proteinuria.\par Pt seen and examined; feels well\par Pt checks BP at home ; gets values in 109 -120's DBP; cant recall DBP\par Reports she was diagnosed with HTN in 28th week of pregnancy; denies any significant PMH prior to that\par \par Pt's son is 2.5 months old now; she is breast feeding \par \par \par Non smoker; denies Etoh abuse/  illciit drug use \par + FH of pre-eclampsia in sister in 2nd ; had a spontaneous  in first pregnancy\par \par

## 2023-05-18 NOTE — PHYSICAL EXAM
[General Appearance - Alert] : alert [General Appearance - In No Acute Distress] : in no acute distress [Sclera] : the sclera and conjunctiva were normal [Outer Ear] : the ears and nose were normal in appearance [Hearing Threshold Finger Rub Not [place]] : hearing was normal [Auscultation Breath Sounds / Voice Sounds] : lungs were clear to auscultation bilaterally [Heart Sounds] : normal S1 and S2 [Edema] : there was no peripheral edema [Abdomen Soft] : soft [No CVA Tenderness] : no ~M costovertebral angle tenderness [Abnormal Walk] : normal gait [] : no rash [Oriented To Time, Place, And Person] : oriented to person, place, and time [Impaired Insight] : insight and judgment were intact

## 2023-08-01 ENCOUNTER — APPOINTMENT (OUTPATIENT)
Dept: NEPHROLOGY | Facility: CLINIC | Age: 38
End: 2023-08-01
Payer: MEDICAID

## 2023-08-01 PROCEDURE — 99214 OFFICE O/P EST MOD 30 MIN: CPT

## 2023-08-06 LAB
ALBUMIN SERPL ELPH-MCNC: 4.9 G/DL
ANION GAP SERPL CALC-SCNC: 16 MMOL/L
APPEARANCE: CLEAR
BACTERIA: NEGATIVE /HPF
BILIRUBIN URINE: NEGATIVE
BLOOD URINE: NEGATIVE
BUN SERPL-MCNC: 10 MG/DL
CALCIUM SERPL-MCNC: 10 MG/DL
CAST: 0 /LPF
CHLORIDE SERPL-SCNC: 104 MMOL/L
CO2 SERPL-SCNC: 22 MMOL/L
COLOR: YELLOW
CREAT SERPL-MCNC: 0.59 MG/DL
CREAT SPEC-SCNC: 68 MG/DL
CREAT/PROT UR: 0.3 RATIO
EGFR: 120 ML/MIN/1.73M2
EPITHELIAL CELLS: 2 /HPF
GLUCOSE QUALITATIVE U: NEGATIVE MG/DL
GLUCOSE SERPL-MCNC: 91 MG/DL
KETONES URINE: NEGATIVE MG/DL
LEUKOCYTE ESTERASE URINE: ABNORMAL
MICROSCOPIC-UA: NORMAL
NITRITE URINE: NEGATIVE
PH URINE: 6
PHOSPHATE SERPL-MCNC: 4.2 MG/DL
POTASSIUM SERPL-SCNC: 3.8 MMOL/L
PROT UR-MCNC: 21 MG/DL
PROTEIN URINE: 30 MG/DL
RED BLOOD CELLS URINE: 1 /HPF
SODIUM SERPL-SCNC: 143 MMOL/L
SPECIFIC GRAVITY URINE: 1.01
UROBILINOGEN URINE: 0.2 MG/DL
WHITE BLOOD CELLS URINE: 2 /HPF

## 2023-09-19 NOTE — OB RN INTRAOPERATIVE NOTE - NS_WOUNDCLASS_OBGYN_ALL_OB
Patient's wife called as patient has been throwing up since Thursday, provider recommendation was to go to the ER to get fluids. Patient's wife was agreeable and verbalized understanding of the situation. Vickie Muñoz RN on 9/19/2023 at 1:23 PM     2

## 2024-02-06 ENCOUNTER — APPOINTMENT (OUTPATIENT)
Dept: NEPHROLOGY | Facility: CLINIC | Age: 39
End: 2024-02-06